# Patient Record
Sex: FEMALE | Race: WHITE
[De-identification: names, ages, dates, MRNs, and addresses within clinical notes are randomized per-mention and may not be internally consistent; named-entity substitution may affect disease eponyms.]

---

## 2018-12-16 ENCOUNTER — HOSPITAL ENCOUNTER (INPATIENT)
Dept: HOSPITAL 80 - FED | Age: 83
LOS: 2 days | Discharge: NURSING, REHAB OR LONG-TERM CARE | DRG: 65 | End: 2018-12-18
Attending: INTERNAL MEDICINE | Admitting: INTERNAL MEDICINE
Payer: COMMERCIAL

## 2018-12-16 DIAGNOSIS — F41.9: ICD-10-CM

## 2018-12-16 DIAGNOSIS — H53.40: ICD-10-CM

## 2018-12-16 DIAGNOSIS — I16.1: ICD-10-CM

## 2018-12-16 DIAGNOSIS — N17.8: ICD-10-CM

## 2018-12-16 DIAGNOSIS — F32.9: ICD-10-CM

## 2018-12-16 DIAGNOSIS — R29.703: ICD-10-CM

## 2018-12-16 DIAGNOSIS — G81.92: ICD-10-CM

## 2018-12-16 DIAGNOSIS — I63.331: Primary | ICD-10-CM

## 2018-12-16 DIAGNOSIS — H40.9: ICD-10-CM

## 2018-12-16 DIAGNOSIS — H54.61: ICD-10-CM

## 2018-12-16 DIAGNOSIS — I11.9: ICD-10-CM

## 2018-12-16 LAB — PLATELET # BLD: 277 10^3/UL (ref 150–400)

## 2018-12-16 NOTE — EDPHY
HPI/HX/ROS/PE/MDM


Narrative: 





CHIEF COMPLAINT:  Left-sided weakness, unsteady





HISTORY OF PRESENT ILLNESS:   The patient is an 82 y/o female arriving with her 

family member for evaluation of left-sided weakness, unsteadiness, and 

difficulty walking since 03:00 this morning, about 17 hours ago. She felt 

normal when she went to sleep about 3 hours prior around midnight. She woke up 

to use the bathroom and "ended up having to crawl and couldn't make it in time.

" She was unable to walk because her left hand and left leg "felt unsteady." 

Throughout the day she continued to have difficulty walking and describes 

numbness and tingling in her left hand. She slept most of the day and when it 

didn't improve she called her daughter who picked her up and brought her to the 

ED for evaluation. Her daughter denies any apparent speech difficulty over the 

phone or in person. No history of hypertension, cardiac disease, respiratory 

disease, or neurologic issues. 





No fever, chills, chest pain, shortness of breath, palpitations, vomiting, 

diarrhea, urinary complaints, headache, neck pain, lightheadedness. 





REVIEW OF SYSTEMS:


Aside from elements discussed in the HPI, a comprehensive 10-point review of 

systems was reviewed and is negative.





PAST MEDICAL HISTORY:   Glaucoma and "major vision problems;" aspirin allergy





SOCIAL HISTORY:   Daughter at bedside. Lives in [place]. Retired. 











VITAL SIGNS: Reviewed by me. /129, .


GENERAL: Well-developed, well-nourished, resting comfortably in no respiratory 

distress.


HEENT: Atraumatic. Eyes: No icterus, no injection. Multiple areas of 

periorbital skin breakdown patient reports is related to an allergic reaction 

to glaucoma eye drops. Mouth: moist mucous membranes.  No erythema or lesions. 

Neck: supple with no adenopathy.


LUNGS: Clear to auscultation bilaterally, no wheezes, rhonchi or rales.


CARDIAC: Tachycardic regular rate and rhythm, no rubs, murmurs or gallops.


ABDOMEN: Soft, nontender, nondistended.


BACK:  No CVA tenderness.


EXTREMITIES: Bruise over right knee and erythema over left knee. No edema.  

Range of motion is normal throughout.


NEURO: Alert and oriented, motor strength appears to be 5/5 throughout.  

Sensory intact to light touch.  Patient's speech is fluid.  She is alert and 

oriented and able to name common objects.  Extraocular movements intact.  

Cranial nerves 2-12 intact


SKIN: Warm and dry, no rash.


PSYCHIATRIC: Normal mentation, no agitation.





Portions of this note were transcribed by a medical scribe.  I personally 

performed a history, physical exam, medical decision making, and confirmed 

accuracy of information the transcribed note.








ED Course: 


This is an 82 y/o female with a history of glaucoma who presents with 

difficulty walking and left-sided weakness and paresthesias upon waking around 

03:00 this morning, 17 hours ago. She last felt normal when she went to bed at 

midnight, 20 hours ago. She has an unsteady gait, but no focal deficits. She is 

hypertensive in the 240/120 range. Presentation concerning for stroke, but she 

is outside the window for TPA. Plan for IV, labs, EKG, head CT. 1L IV NS.





Head CT shows subtle hypodensity and edema right temporal region which could 

represent acute stroke.  Patient will benefit from a brain MRI





Given the patient's consistent and extreme hypertension, systolics greater than 

220 and diastolics greater than 120, cardiac drip was ordered.  15% reduction 

the patient's blood pressure would be 187 over 89. 





The 12 lead EKG was interpreted by myself. Sinus tachycardia, LVH, 

repolarization abnormality. See hard copy and/or "tracemaster" electronic copy 

for interpretation.





2130: Spoke with hospitalist service. Dr. Obregon accepts admission. 





Discussed the emergency department course, need for brief blood control, and 

concerns regarding acute stroke with the family and daughter.  They understand 

reasons to be admitted to the hospital.  Patient continues to complain only of 

a tingling feeling in the left upper extremity and a a weird feeling in the 

left lower extremity.








MDM: 





Differential diagnoses the patient's presenting complaints was considered 

including but not limited to intracranial injury, TIA, ischemic cerebrovascular

  accident, hemorrhagic cerebrovascular accident, hypoglycemia, complex migraine

, metastases, tumor, seizure, or electrolyte abnormality.





- Data Points


Imaging Results: 


 Imaging Impressions





Head CT  12/16/18 20:27


Impression: Possible small area of edema in the region of the anterior limb of 

the right internal capsule. Consider MRI without contrast for further 

evaluation.


 


Results called to Dr. Roma Milton at 9:18 PM.


 


General information for patients regarding this examination can be found at 

[email or web address].


 


If you have questions or comments about this report, please contact me at [phone] (hospital) or [phone] (cell). 











Imaging: Discussed imaging studies w/ On call Radiologist, I viewed and 

interpreted images myself


Laboratory Results: 


 Laboratory Results





 12/16/18 20:16 





 12/16/18 20:16 





 











  12/16/18 12/16/18 12/16/18





  20:35 20:16 20:16


 


WBC      





    


 


RBC      





    


 


Hgb      





    


 


Hct      





    


 


MCV      





    


 


MCH      





    


 


MCHC      





    


 


RDW      





    


 


Plt Count      





    


 


MPV      





    


 


Neut % (Auto)      





    


 


Lymph % (Auto)      





    


 


Mono % (Auto)      





    


 


Eos % (Auto)      





    


 


Baso % (Auto)      





    


 


Nucleat RBC Rel Count      





    


 


Absolute Neuts (auto)      





    


 


Absolute Lymphs (auto)      





    


 


Absolute Monos (auto)      





    


 


Absolute Eos (auto)      





    


 


Absolute Basos (auto)      





    


 


Absolute Nucleated RBC      





    


 


Immature Gran %      





    


 


Immature Gran #      





    


 


Sodium      143 mEq/L mEq/L





     (135-145) 


 


Potassium      4.2 mEq/L mEq/L





     (3.5-5.2) 


 


Chloride      111 mEq/L H mEq/L





     () 


 


Carbon Dioxide      19 mEq/l L mEq/l





     (22-31) 


 


Anion Gap      13 mEq/L mEq/L





     (6-14) 


 


BUN      21 mg/dL mg/dL





     (7-23) 


 


Creatinine      1.2 mg/dL H mg/dL





     (0.6-1.0) 


 


Estimated GFR      43 





    


 


Glucose      120 mg/dL H mg/dL





     () 


 


Calcium      9.6 mg/dL mg/dL





     (8.5-10.4) 


 


POC Troponin I  0.02 ng/mL ng/mL    





   (0.00-0.08)   


 


TSH    Pending   





    














  12/16/18





  20:16


 


WBC  8.74 10^3/uL 10^3/uL





   (3.80-9.50) 


 


RBC  5.09 10^6/uL 10^6/uL





   (4.18-5.33) 


 


Hgb  14.7 g/dL g/dL





   (12.6-16.3) 


 


Hct  44.5 % %





   (38.0-47.0) 


 


MCV  87.4 fL fL





   (81.5-99.8) 


 


MCH  28.9 pg pg





   (27.9-34.1) 


 


MCHC  33.0 g/dL g/dL





   (32.4-36.7) 


 


RDW  13.6 % %





   (11.5-15.2) 


 


Plt Count  277 10^3/uL 10^3/uL





   (150-400) 


 


MPV  10.5 fL fL





   (8.7-11.7) 


 


Neut % (Auto)  79.9 % H %





   (39.3-74.2) 


 


Lymph % (Auto)  12.4 % L %





   (15.0-45.0) 


 


Mono % (Auto)  6.3 % %





   (4.5-13.0) 


 


Eos % (Auto)  0.6 % %





   (0.6-7.6) 


 


Baso % (Auto)  0.6 % %





   (0.3-1.7) 


 


Nucleat RBC Rel Count  0.0 % %





   (0.0-0.2) 


 


Absolute Neuts (auto)  6.99 10^3/uL H 10^3/uL





   (1.70-6.50) 


 


Absolute Lymphs (auto)  1.08 10^3/uL 10^3/uL





   (1.00-3.00) 


 


Absolute Monos (auto)  0.55 10^3/uL 10^3/uL





   (0.30-0.80) 


 


Absolute Eos (auto)  0.05 10^3/uL 10^3/uL





   (0.03-0.40) 


 


Absolute Basos (auto)  0.05 10^3/uL 10^3/uL





   (0.02-0.10) 


 


Absolute Nucleated RBC  0.00 10^3/uL 10^3/uL





   (0-0.01) 


 


Immature Gran %  0.2 % %





   (0.0-1.1) 


 


Immature Gran #  0.02 10^3/uL 10^3/uL





   (0.00-0.10) 


 


Sodium  





  


 


Potassium  





  


 


Chloride  





  


 


Carbon Dioxide  





  


 


Anion Gap  





  


 


BUN  





  


 


Creatinine  





  


 


Estimated GFR  





  


 


Glucose  





  


 


Calcium  





  


 


POC Troponin I  





  


 


TSH  





  











Medications Given: 


 








Discontinued Medications





Nicardipine/Sodium Chloride (Cardene 0.1 Mg/Ml  (Premix))  200 mls @ 0 mls/hr 

IV EDNOW ONE; Titrate


   PRN Reason: Protocol


   Stop: 12/16/18 21:21


   Last Admin: 12/16/18 21:32 Dose:  200 mls


Sodium Chloride (Ns)  1,000 mls @ 0 mls/hr IV ONCE ONE; Wide Open


   PRN Reason: Protocol


   Stop: 12/16/18 21:35


   Last Admin: 12/16/18 21:47 Dose:  1,000 mls





Point of Care Test Results: 


 Chemistry











  12/16/18





  20:35


 


POC Troponin I  0.02 ng/mL ng/mL





   (0.00-0.08) 














General


Time Seen by Provider: 12/16/18 20:29


Initial Vital Signs: 


 Initial Vital Signs











Temperature (C)  37.4 C   12/16/18 20:00


 


Heart Rate  116 H  12/16/18 20:00


 


Respiratory Rate  18   12/16/18 20:00


 


Blood Pressure  233/107 H  12/16/18 20:00


 


O2 Sat (%)  95   12/16/18 20:00








 











O2 Delivery Mode               Room Air














Allergies/Adverse Reactions: 


 





aspirin Allergy (Verified 12/16/18 19:58)


 


glaucoma drops Allergy (Uncoded 12/16/18 19:58)


 








Home Medications: 














 Medication  Instructions  Recorded


 


Bimatoprost 0.01% [Lumigan 0.01% 1 drop EACHEYE HS 12/16/18





(*)]  


 


Dorzolamide/Timolol/Pf [Cosopt Pf 1 drop EACHEYE BID 12/16/18





Eye Drops]  


 


Escitalopram Oxalate [Lexapro] 20 mg PO HS 12/16/18


 


Mirtazapine [Mirtazapine] 15 mg PO HS 12/16/18














Departure





- Departure


Disposition: [facility] Inpatient Acute


Clinical Impression: 


 Numbness and tingling of left arm and leg





CVA (cerebral vascular accident)


Qualifiers:


 CVA mechanism: other Qualified Code(s): I63.89 - Other cerebral infarction





Condition: Good


Report Scribed for: Roma Milton


Report Scribed by: Kell Reynoso


Date of Report: 12/16/18


Time of Report: 21:23

## 2018-12-16 NOTE — CPEKG
Test Reason : OPEN

Blood Pressure : ***/*** mmHG

Vent. Rate : 107 BPM     Atrial Rate : 106 BPM

   P-R Int : 185 ms          QRS Dur : 085 ms

    QT Int : 349 ms       P-R-T Axes : 037 -17 -08 degrees

   QTc Int : 466 ms

 

Sinus tachycardia

Probable left atrial enlargement

LVH with secondary repolarization abnormality

 

Confirmed by Sarah García ([id number]) on 12/16/2018 9:03:12 PM

 

Referred By:             Confirmed By:Sarah García

## 2018-12-17 LAB — PLATELET # BLD: 214 10^3/UL (ref 150–400)

## 2018-12-17 RX ADMIN — MIRTAZAPINE SCH MG: 15 TABLET, ORALLY DISINTEGRATING ORAL at 21:02

## 2018-12-17 RX ADMIN — ATORVASTATIN CALCIUM SCH MG: 40 TABLET, FILM COATED ORAL at 17:51

## 2018-12-17 RX ADMIN — MIRTAZAPINE SCH: 15 TABLET, ORALLY DISINTEGRATING ORAL at 04:55

## 2018-12-17 NOTE — ECHO
[email or web address]





[address] [address] 

Main: [phone] 



Fax: 



Transthoracic Echocardiogram 

Name:             VOLODYMYR SERNA                               MR#:

[id number]

Study Date:       2018                               Study Time:

07:37 AM

YOB: 1935                               Age:

83 year(s)

Height:           154.9 cm (61 in.)                        Weight:

56.7 kg (125 lb.)

BSA:              1.55 m2                                  Gender:

Female

Examination:      Echo                                     Indication:

Ischemic stroke

Image Quality:                                             Contrast: 

Requested by:     Annamaria Toussaint                             BP:

146 mmHg/98 mmHg

Heart Rate:                                                Rhythm: 

Indication:       Ischemic stroke 



Procedure Staff 

Ultrasound Technician:   Kareen Villalobos [facility] 

Reading Physician:       Anup Gardiner MD 

Requesting Provider: 



Conclusions:          Normal size left ventricle.  

Mild concentric LV hypertrophy.  

The ejection fraction is estimated to be 70-75 %.  

Normal diastolic LV function.  

Trivial mitral valve regurgitation.  

Minimal aortic cusp calcification is noted.  

Mild to moderate tricuspid valve regurgitation.  

The pulmonary artery pressure is normal.  

Moderate pulmonic valve regurgitation is noted.  

Consider CECI if there is strong clinical suspicion for cardioembolism.





Measurements: 

Chambers                    Valvular Assessment AV/MV

Valvular Assessment TV/PV



Normal                                   Normal

Normal

Name         Value     Range              Name         Value Range

Name           Value Range

Ao Vane (MM): 3.4 cm    (2.2 cm-3.7            AV Vmax:     1.38 m/s (1

m/s-1.7       TR Vmax:       2.61 mm/s ( - )



cm)                                  m/s)             TR PGmax:

27 mmHg ( - )

IVSd (2D):   0.8 cm (0.6 cm-1.1               AV maxP mmHg ( -

)          syst. PAP: 32 mmHg  ( - )



cm)                AV meanP mmHg ( - )          PV Vmax:

0.88 m/s (0.6 m/s-0.9

LVDd (2D):   4.3 cm    (3.9 cm-5.3            MV E Vmax:   0.65 m/s (

- )                            m/s)



cm)                MV A Vmax:   1.22 m/s ( - )        PV PGmax:      3

mmHg ( - )

LVDs (2D):   2.8 cm    (2.1 cm-4              MV E/A:      0.53 ( - )





cm)   

LVPWd (2D):  0.9 cm    ( - )   

LVEF (BP):   78 %      (>=55 %)   

EF Range:    70-75 % 



Continued Measurements: 

Chambers                    Valvular Assessment AV/MV

Valvular Assessment TV/PV



Patient: VOLODYMYR SERNA                           MRN: [id number]

Study Date: 2018   Page 1 of 2

07:37 AM 









Name                       Value  Name                      Value

Name                      Value

LADs:                    3.4 cm               MV E/E' Septal:

15.60     CVP (est.):             5 mmHg

LADs Lon.0 cm               MV E/E' Lateral:

17.20

LA Area:                 18.1 cm2   

LA Volume:               54 ml   

LA Volume Index:         34.8 ml/m2   



Additional Vessels  



Name                       Value  

Ao Ascending:            3.8 cm    



Findings:             Left Ventricle: 

Normal size left ventricle. Mild concentric LV hypertrophy. Global

hypercontractility of the left

ventricle. The ejection fraction is estimated to be 70-75 %. No

regional wall motion abnormality.

Normal diastolic LV function. E/a wave reversal..  

Right Ventricle: 

Normal size right ventricle.  

Left Atrium: 

The left atirum is borderline dilated. An agitated saline study was

performed and was negative for

intracardiac shunting.  

Right Atrium: 

The right atrium is normal in size.  

Mitral Valve: 

The mitral valve is normal in appearance and function. Trivial mitral

valve regurgitation.

Aortic Valve: 

The aortic valve is tri-leaflet. Minimal aortic cusp calcification is

noted.

Tricuspid Valve: 

The tricuspid valve is normal in appearance and function. Mild to

moderate tricuspid valve

regurgitation. The pulmonary artery pressure is normal.  

Pulmonic Valve: 

The pulmonic valve is normal in appearance and function. Moderate

pulmonic valve regurgitation is

noted.  

Aorta: 

Borderline dilated ascending aorta.. The aorta is normal.  

Pericardium: 

Trivial pericardial effusion. 







Electronically signed by Anup Gardiner MD on 2018 at 10:02 AM 

(No Signature Object) 



Patient: VOLODYMYR SERNA                           MRN: [id number]

Study Date: 2018   Page 2 of 2

07:37 AM 







[id number]

## 2018-12-17 NOTE — GCON
NEUROLOGIC CONSULTATION



REFERRING PHYSICIAN:  Kelly J. Cushing, MD



HISTORY:  The patient is an 83-year-old woman whom I am asked to see in neurologic consultation regar
ding left-sided weakness with onset yesterday around 3 in the morning.  She had gotten up and recogni
zed feeling unsteady to the point that she could not walk safely and was trying to get to the bathroo
m and did not quite make it.  She definitely feels her left side was weak and remains a little bit we
ak compared to her normal baseline.  She is left-handed.  She also feels a little bit of numbness in 
the left arm.  She has never had this occur in the past.  The patient came to the emergency room and 
had a head CT showing possible changes in the right internal capsule of early ischemia but uncertain.
  Carotid ultrasound has been performed without any significant stenoses.  She is not on antiplatelet
 therapy because she has a history of some suspected mild GI bleeding, and also has known eye disease
 with history of bleeding, for which there has been concern about risk of antiplatelet therapy as bes
t I understand.  I have tried to reach her ophthalmologist, but he is not immediately available, and 
left a message. 



She denies any acute headache.  She is not feeling confused.  No chest pain, palpitations, or shortne
ss of breath.  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  History of glaucoma, anxiety, depression.



FAMILY HISTORY:  Unremarkable.  She was never a smoker.  She is retired from working for 40 years as 
a medical technician.  No alcohol or drug use.  She has been  for 30 years and lives in [facility]
or with her family nearby.



MEDICATIONS:  At home, eyedrop, Lumigan, as well as Cosopt, Lexapro, and mirtazapine.



ALLERGIES:  She is not allergic to aspirin, but has risk of bleeding.



REVIEW OF SYSTEMS:  As outlined above, otherwise unremarkable.



PHYSICAL EXAM:  VITAL SIGNS:  She initially had a high blood pressure of 233/107 and received some ni
cardipine, and that was diminished down now to where her current pressure is 154/85, and this was all
 brought down gradually.  Pulse has been in the range of 90.  Respirations 13, temperature 37.1.  GEN
ERAL:  She is well developed, in no acute distress.  EYES:  Clear.  NECK:  Supple.  No bruits or mass
es.  CARDIAC:  Regular rate and rhythm.  No murmur.  NEUROLOGIC:  She is awake, alert and attentive, 
fully oriented to person, place, and time and general situation.  She is nearly blind in the right ey
e, and has diminished vision in the left eye.  Visual field testing is unreliable for great detail.  
Extraocular movements are intact.  Normal facial sensation and strength.  Palate elevates symmetrical
ly.  Tongue protrudes midline.  Hearing is diminished bilaterally.  She is not wearing her hearing ai
ds.  Motor exam:  She has a drift in the left upper extremity and left lower extremity.  Actual muscl
e power is a 4/5 range.  Rapid alternating movements are a little slower in the left hand than the ri
ght.  Sensation is preserved for temperature and light touch.  She can stand by herself from the seat
ed position and is able to maintain her balance with some standby assist.  As she ambulated with the 
physical therapist, she was able to walk, but she has abnormal placement of the left foot and is a li
ttle bit unsteady.  Postural reflexes are partially impaired.  Reflexes are a little more brisk on th
e left than the right and probably a left Babinski sign, but normal on the right.



IMPRESSION:  Total unit time:  70 minutes.  The patient has an NIH Stroke Scale of 2.  She probably h
as experienced a lacunar syndrome, but we will look for any embolic source.  No obvious large vessel 
stenosis in the carotid ultrasound.  Brain MRI and MR angiogram pending, as well as echocardiogram wi
th bubble study.  With regard to antiplatelet therapy, she should be on this, but the history of blee
ding, as well as risk of bleeding in the eye need to be further assessed before making a risk/benefit
 analysis, and we discussed that.  I put in a phone call to Dr. Prince at the [facility]
 to see if I can get his input on the relative risk for bleeding in the eye.  Statin therapy will be 
appropriate to initiate, and she has a lipid panel showing an LDL cholesterol of 183.  Therefore, she
 should be started on statin therapy before leaving the hospital.



Copy requested to:

Dr. Prince 

 Ophthalmology Dept



Job #:  [id number]

## 2018-12-17 NOTE — HOSPPROG
Hospitalist Progress Note


Assessment/Plan: 





CVA - MRI showed acute infarct in right thalamus and right occipital lobe.  MRA 

with thrombosed/occluded right posterior cerebral artery.  Discussed with 

neurology, no indication for transfer at this point.  Searching for embolic 

source.  Carotid artery u/s without flow limiting stenosis.


   -neurology to discuss with opthalmology re: anti-platelet tx given h/o 

opthalmic hemorrhage


   -echo pending 


   -cont telemetry to look for A fib, will need outpt cardiac event monitor if 

no a fib seen here


   -start statin ()


   -permissive hypertension, Labetalol for SBP >220, DBP >120


   -PT/OT, inpt rehab eval requested





Hypertension - initially required cardene drip, which is now off and she is 

normotensive without meds


   -monitor, permissive htn for now


   -goal BP <140/90 in next 24 hrs





Anxiety / Depression - cont lexapro, mirtazapine





Glaucoma - cont outpt eye drops





Limited resuscitation - shock only, no compressions, no intubation


Subjective: Pt feels ok, still has some right sided weakness. No speech 

difficulties.  Eating ok.  No ha or vision changes.


Objective: 


 Vital Signs











Temp Pulse Resp BP Pulse Ox


 


 37.1 C   91   13   154/85 H  97 


 


 12/17/18 08:00  12/17/18 08:00  12/17/18 08:00  12/17/18 08:00  12/17/18 08:00








 Laboratory Results





 12/17/18 04:30 





 12/17/18 04:30 





 











 12/16/18 12/17/18 12/18/18





 05:59 05:59 05:59


 


Intake Total  330 


 


Output Total  350 


 


Balance  -20 














- Physical Exam


Constitutional: no apparent distress


Eyes: PERRL


Ears, Nose, Mouth, Throat: moist mucous membranes


Cardiovascular: regular rate and rhythym


Respiratory: no respiratory distress, clear to auscultation


Gastrointestinal: normoactive bowel sounds, soft, non-tender abdomen


Skin: warm


Musculoskeletal: other (+RUE and RLE weakness)


Neurologic: AAOx3


Psychiatric: interacting appropriately





ICD10 Worksheet


Patient Problems: 


 Problems











Problem Status Onset


 


CVA (cerebral vascular accident) Acute  


 


Numbness and tingling of left arm and leg Acute

## 2018-12-17 NOTE — ASMTCMCOM
CM Note

 

CM Note                       

Notes:

82yo female admitted for CVA-L sided weakness, HTN ER, NAVID, Tachy. She has a Hx of Glaucoma, Vision 


loss, Anxiety, Depression. She has been living independently at home. Her daughter, Mamta is her 

MPOA, Code status is Limited Resuscitation. Therapies to eval for discharge needs. CM to follow.

 

Date Signed:  12/17/2018 09:59 AM

Electronically Signed By:Nora Morris LCSW

## 2018-12-17 NOTE — PDMN
Medical Necessity


Medical necessity: Pt meets IP criteria as of 12/16/2018 per MD and MCG MG-N (

Neurology GRG); est los > 2 mn for ongoing tx and management of acute CVA with 

L sided weakness as well as hypertensive emergency, sinus tachycardia, and NAVID; 

requiring further workup, neurology consultation, cardene gtt, ICU level care, 

and therapies.

## 2018-12-18 ENCOUNTER — HOSPITAL ENCOUNTER (INPATIENT)
Dept: HOSPITAL 80 - BREH | Age: 83
LOS: 15 days | Discharge: HOME HEALTH | DRG: 57 | End: 2019-01-02
Attending: INTERNAL MEDICINE | Admitting: INTERNAL MEDICINE
Payer: COMMERCIAL

## 2018-12-18 VITALS — SYSTOLIC BLOOD PRESSURE: 148 MMHG | DIASTOLIC BLOOD PRESSURE: 95 MMHG

## 2018-12-18 DIAGNOSIS — H53.462: ICD-10-CM

## 2018-12-18 DIAGNOSIS — G31.84: ICD-10-CM

## 2018-12-18 DIAGNOSIS — I69.364: Primary | ICD-10-CM

## 2018-12-18 DIAGNOSIS — I12.9: ICD-10-CM

## 2018-12-18 DIAGNOSIS — R32: ICD-10-CM

## 2018-12-18 DIAGNOSIS — H40.9: ICD-10-CM

## 2018-12-18 DIAGNOSIS — I69.398: ICD-10-CM

## 2018-12-18 DIAGNOSIS — F41.8: ICD-10-CM

## 2018-12-18 DIAGNOSIS — H54.61: ICD-10-CM

## 2018-12-18 DIAGNOSIS — N18.3: ICD-10-CM

## 2018-12-18 PROCEDURE — F08Z7ZZ VOCATIONAL ACTIVITIES AND FUNCTIONAL COMMUNITY OR WORK REINTEGRATION SKILLS TREATMENT: ICD-10-PCS | Performed by: INTERNAL MEDICINE

## 2018-12-18 PROCEDURE — F07M3ZZ MOTOR FUNCTION TREATMENT OF MUSCULOSKELETAL SYSTEM - WHOLE BODY: ICD-10-PCS | Performed by: INTERNAL MEDICINE

## 2018-12-18 PROCEDURE — F0636ZZ COMMUNICATIVE/COGNITIVE INTEGRATION SKILLS TREATMENT OF NEUROLOGICAL SYSTEM - WHOLE BODY: ICD-10-PCS | Performed by: INTERNAL MEDICINE

## 2018-12-18 RX ADMIN — ATORVASTATIN CALCIUM SCH MG: 40 TABLET, FILM COATED ORAL at 09:42

## 2018-12-18 RX ADMIN — MIRTAZAPINE SCH MG: 15 TABLET, ORALLY DISINTEGRATING ORAL at 19:59

## 2018-12-18 RX ADMIN — BIMATOPROST SCH DROPS: 0.1 SOLUTION/ DROPS OPHTHALMIC at 19:58

## 2018-12-18 NOTE — ASDISCHSUM
----------------------------------------------

Discharge Information

----------------------------------------------

Plan Status:Inpatient Rehab                          Medically Cleared to Leave:12/17/2018

Discharge Date:12/17/2018                            CM D/C Disposition:[facility]

ADT D/C Disposition:[facility] IP                Projected Discharge Date:12/18/2018 03:00 PM

Transportation at D/C:Wheelchair Van                 Discharge Delay Reason:

Follow-Up Date:12/18/2018 03:00 PM                   Discharge Slot:2 - 12:01 pm - 18:00 pm

Final Diagnosis:CVA-L sided weakness, HTN ER, NAVID, Tachy

----------------------------------------------

Placement Information

----------------------------------------------

Referral Type:Rehabilitation Hospital                Referral ID:[id number]

Provider Name:[facility] Inpatient [facility]hab

Address 1:[address]                            Phone Number:

Address 2:                                           Fax Number:

[facility]:[place]                                         Selection Factors:

State:[place]

 

----------------------------------------------

Patient Contact Information

----------------------------------------------

Contact Name:DONALD                          Relationship:Daughter

Address:                                             Home [phone]

                                                     Work Phone:

City:                                                [facility] Phone:

[facility] Code:                                      Email:

----------------------------------------------

Financial Information

----------------------------------------------

Financial Class:Medicare

Primary Plan Desc:MEDICARE INPATIENT                 Primary Plan Number:[id number]

Secondary Plan Desc:NROA [place]                 Secondary Plan Number:[id number]

 

 

----------------------------------------------

Assessment Information

----------------------------------------------

----------------------------------------------

LACE

----------------------------------------------

LACE

 

Length of stay for            Answers:  2 days                                

current admission                                                             

Acuity / Level of             Answers:  Yes                                   

Care: Did the patient                                                         

have an inpatient                                                             

admission?                                                                    

Comorbidities - select        Answers:  Cerebrovascular disease               

all that apply                          (CVA, TIA, aneurysms, vasc            

                                        ular dementia)                        

                                        Other                         Notes:  Visual deficits; Glauco
ma

# of Emergency department     Answers:  1-2                                   

visits in the last 6                                                          

months                                                                        

Social determinants           Answers:  Mental health diagnosis               

                                        (anxiety, depression, pers            

                                        onality disorders, etc.)              

Score: 11

 

Date Signed:  12/18/2018 02:12 PM

Electronically Signed By:Nora Morris LCSW

 

 

----------------------------------------------

[facility] CARLOS Progress Note

----------------------------------------------

CM Note

 

CM Note                       

Notes:

84yo female admitted for CVA-L sided weakness, HTN ER, NAVID, Tachy. She has a Hx of Glaucoma, Vision 


loss, Anxiety, Depression. She has been living independently at home. Her daughter, Mamta is her 

MPOA, Code status is Limited Resuscitation. Therapies to eval for discharge needs. CM to follow.

 

Date Signed:  12/17/2018 09:59 AM

Electronically Signed By:Nora Morris LCSW

 

 

----------------------------------------------

[facility] CM Progress Note

----------------------------------------------

CM Note

 

CM Note                       

Notes:

Met with patient's daughter, Mamta in a 'Family Meeting" 12/17/18. Mamta reports that her 

mother moved her from [place]. She is living at [facility] in [place]. Patient is [place] and 

has poor eyesight since she was 32.  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 Has Anxiety and Depression and has been in In-pt Psych for her depression 3x. Patient had 

been seeing a Geriatric psychiatrist and then a psychologist until they recently agreed she no 

longer needed that assist. Daughter concerned that her mother is an introvert, poor sight and 

hearing tends to isolate herself which adds to the depression. Patient enjoys crossword puzzles and 


her IPad. She has friends and a Bro-in-law in [place] she stays in touch with her IPad and two 

grandchildren 15 + 14. We talked about In-pt Rehab on discharge. [facility] In-pt Rehab doesn't have beds 

available today, but will Wednesday. 

Daughter interested in [facility] In-pt Rehab.

 

Date Signed:  12/18/2018 12:03 PM

Electronically Signed By:Nora Morris LCSW

 

 

----------------------------------------------

Case Management Discharge Plan Note

----------------------------------------------

Case Management Discharge

 

Discharge Order Complete?     Answers:  Yes                                   

Patient to Obtain             Answers:  Other                         Notes:  In-pt Rehab

Medications                                                                   

Transportation Arranged       Answers:  Family/Friends                        

Transport will Pick (Date     12/18/2018 02:30 PM

& Time)                       

Faxed Final Orders            Answers:  Yes                           Notes:  In-pt Rehab

Family Notified               Answers:  Yes                           Notes:  Dtr to transport

Discharge Comments            

Notes:

Patient has been discharged to In-pt Rehab. Dtr to transport

 

Date Signed:  12/18/2018 02:14 PM

Electronically Signed By:Nora Morris LCSW

 

 

----------------------------------------------

Intervention Information

----------------------------------------------

## 2018-12-18 NOTE — ASMTLACE
LACE

 

Length of stay for            Answers:  2 days                                

current admission                                                             

Acuity / Level of             Answers:  Yes                                   

Care: Did the patient                                                         

have an inpatient                                                             

admission?                                                                    

Comorbidities - select        Answers:  Cerebrovascular disease               

all that apply                          (CVA, TIA, aneurysms, vasc            

                                        ular dementia)                        

                                        Other                         Notes:  Visual deficits; Glauco
ma

# of Emergency department     Answers:  1-2                                   

visits in the last 6                                                          

months                                                                        

Social determinants           Answers:  Mental health diagnosis               

                                        (anxiety, depression, pers            

                                        onality disorders, etc.)              

Score: 11

 

Date Signed:  12/18/2018 02:12 PM

Electronically Signed By:Nora Morris LCSW

## 2018-12-18 NOTE — ASMTDCNOTE
Case Management Discharge

 

Discharge Order Complete?     Answers:  Yes                                   

Patient to Obtain             Answers:  Other                         Notes:  In-pt Rehab

Medications                                                                   

Transportation Arranged       Answers:  Family/Friends                        

Transport will Pick (Date     12/18/2018 02:30 PM

& Time)                       

Faxed Final Orders            Answers:  Yes                           Notes:  In-pt Rehab

Family Notified               Answers:  Yes                           Notes:  Dtr to transport

Discharge Comments            

Notes:

Patient has been discharged to In-pt Rehab. Dtr to transport

 

Date Signed:  12/18/2018 02:14 PM

Electronically Signed By:Nora Morris LCSW
oriented to person, place, time and situation

## 2018-12-18 NOTE — HOSPPROG
Hospitalist Progress Note


Assessment/Plan: 





#Acute CVA: in PCA territory


-ASA, statin. Inpatient rehab consult





#HHTN





#Glaucoma





#Anxiety/depression














See DC summary for A&P


Objective: 


 Vital Signs











Temp Pulse Resp BP Pulse Ox


 


 36.9 C   80   16   148/95 H  91 L


 


 12/18/18 07:39  12/18/18 07:39  12/18/18 07:39  12/18/18 07:39  12/18/18 07:39








 Laboratory Results





 12/17/18 04:30 





 12/17/18 04:30 





 











 12/17/18 12/18/18 12/19/18





 05:59 05:59 05:59


 


Intake Total 330 650 


 


Output Total 350  


 


Balance -20 650 














ICD10 Worksheet


Patient Problems: 


 Problems











Problem Status Onset


 


CVA (cerebral vascular accident) Acute  


 


Numbness and tingling of left arm and leg Acute

## 2018-12-18 NOTE — PDIAF
- Diagnosis


Diagnosis: CVA


Code Status: Limited Resuscitation





- Medication Management


Discharge Medications: electronically signed and located in the Home Medication 

List.





- Orders


Services needed: Registered Nurse, Certified Nursing Aide, Physical Therapy, 

Occupational Therapy, Speech Language Pathologist


Diet Recommendation: cardiac -low fat low salt


Diet Texture: Regular Texture Diet


Additional Instructions: 


Monitor blood pressure. May need addition of Norvasc 2.5mg and titration 

starting 12/19/18





- Follow Up Care


Current Providers and Referrals: 


Ang Smipson MD [Primary Care Provider] - As per Instructions

## 2018-12-18 NOTE — GDS
DISCHARGE DIAGNOSES:  

1.  Acute stroke in the right posterior cerebral artery territory.  

2.  Hypertension.

3.  Anxiety/depression.

4.  Glaucoma.

5.  Right eye blindness.



HISTORY OF PRESENT ILLNESS:  A pleasant 83-year-old female with glaucoma, vision loss, anxiety/depres
mateo who presented to the ER with complaints of left-sided weakness starting 17 hours prior to visit.
  She woke at 3 a.m. on 12/16, tried to go to the bathroom, and was weak on the left side and stumble
d.  She was too weak to walk, so tried to pull herself up by crawling on the floor.  She denied loss 
of consciousness.  Denied any prodromal chest pain, shortness of breath, or dizziness.  She went back
 to sleep, but experienced numbness and tingling in her left hand when she awoke.



HOSPITAL COURSE BY PROBLEM:  

1.  Acute infarct, right occipital lobe/acute lacunar infarct of the right thalamus.  The patient was
 evaluated by Neurology.  No evidence of atrial fibrillation.  Echocardiogram negative for intracardi
ac shunting.  She was started on aspirin and statin.  There was concern of bleeding into her eye with
 her significant vision issues.  Dr. Shaw spoke with her primary ophthalmologist, simon Gandhi
ho agreed that aspirin is necessary given acute stroke.  Will monitor blood pressure.  Will not start
 antihypertensive at this time.  The patient was evaluated by PT, OT, and had speech evaluation here.
  She will be transferred to inpatient rehab to continue therapies.

2.  Glaucoma.  Continue eyedrops.  Follow up with her ophthalmologist.

3.  Anxiety/depression.  Continue home medications.

4.  Hypertension.  Initially required Cardene drip.  Normotensive now.  May need the addition of a lo
w-dose antihypertensive, such as Norvasc, in the next several days if it remains greater than 150s to
 160s.



GOALS:  Limited resuscitation shock only.  No compressions or intubation.



DISPOSITION:  The patient is stable for discharge to inpatient rehab.



PHYSICAL EXAMINATION:  VITAL SIGNS:  Today, temperature 36.9, blood pressure is 148/95, heart rate is
 in the 80s, respiration rate is 16, 91% on room air.  GENERAL:  Sitting up in the chair.  No acute d
istress.  HEENT:  Right eye blindness.  CV:  Regular rate and rhythm.  LUNGS:  Clear.  ABDOMEN:  Soft
, nontender, and nondistended with positive bowel sounds.  :  No Naik.  MUSCULOSKELETAL:  Moving a
ll 4 extremities.  NEURO:  2 through 12 intact.  PSYCH:  Alert and oriented x3.  Very pleasant. 



Time spent on discharge greater than 35 minutes, explaining plan to the patient and daughter and disc
ussing case with Dr. Shaw.





Job #:  [id number]

## 2018-12-18 NOTE — ASMTCMCOM
CM Note

 

CM Note                       

Notes:

Met with patient's daughter, Mamta in a 'Family Meeting" 12/17/18. Mamta reports that her 

mother moved her from [place]. She is living at [facility] in [place]. Patient is [place] and 

has poor eyesight since she was 32.  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 Has Anxiety and Depression and has been in In-pt Psych for her depression 3x. Patient had 

been seeing a Geriatric psychiatrist and then a psychologist until they recently agreed she no 

longer needed that assist. Daughter concerned that her mother is an introvert, poor sight and 

hearing tends to isolate herself which adds to the depression. Patient enjoys crossword puzzles and 


her IPad. She has friends and a Bro-in-law in [place] she stays in touch with her IPad and two 

grandchildren 15 + 14. We talked about In-pt Rehab on discharge. [facility] In-pt Rehab doesn't have beds 

available today, but will Wednesday. 

Daughter interested in [facility] In-pt Rehab.

 

Date Signed:  12/18/2018 12:03 PM

Electronically Signed By:Nora Morris LCSW

## 2018-12-18 NOTE — NEUROPROG
Assessment: 


25 min total unit time.  The patient has an acute stroke in the right posterior 

cerebral artery territory of uncertain cause.  I had a discussion with her 

ophthalmologist at the [facility].  He and I both feel that the 

risk of stroke is greater than the risk of significant bleeding in the eye with 

use of anti-platelet therapy, so I will initiate baby aspirin and obtain her 

permission to do this.  I will also start statin therapy for secondary stroke 

prophylaxis.  Hopefully she will qualify for inpatient rehab care and 

consultation has been placed.


Subjective: 


The patient is reporting that she is feeling a little bit better but no major 

changes in her symptoms of mild left-sided weakness.


Objective: 





 Vital Signs











Temp Pulse Resp BP Pulse Ox


 


 36.9 C   80   16   148/95 H  91 L


 


 12/18/18 07:39  12/18/18 07:39  12/18/18 07:39  12/18/18 07:39  12/18/18 07:39








 Laboratory Results





 12/17/18 04:30 





 12/17/18 04:30 





 











 12/17/18 12/18/18 12/19/18





 05:59 05:59 05:59


 


Intake Total 330 650 


 


Output Total 350  


 


Balance -20 650 








On examination, I believe she has a left visual field deficit on top of the 

near blindness in the right eye.  There remains a mild left sided weakness.  

With the visual field deficit, her NIH stroke scale would be 3. MRI scan showed 

evidence of an occluded right posterior cerebral artery with corresponding 

acute ischemic changes in that distribution with a thalamic infarct and right 

occipital infarction.  


Allergies/Adverse Reactions: 


 





glaucoma drops Allergy (Uncoded 12/16/18 19:58)

## 2018-12-18 NOTE — PDIAF
- Diagnosis


Diagnosis: CVA


Code Status: Limited Resuscitation





- Medication Management


Discharge Medications: electronically signed and located in the Home Medication 

List.





- Orders


Services needed: Registered Nurse, Certified Nursing Aide, Physical Therapy, 

Occupational Therapy, Speech Language Pathologist


Diet Recommendation: cardiac -low fat low salt


Diet Texture: Regular Texture Diet


Additional Instructions: 


Monitor blood pressure. 





- Follow Up Care


Current Providers and Referrals: 


Ang Simpson MD [Primary Care Provider] - As per Instructions

## 2018-12-19 RX ADMIN — MIRTAZAPINE SCH MG: 15 TABLET, ORALLY DISINTEGRATING ORAL at 20:04

## 2018-12-19 RX ADMIN — ATORVASTATIN CALCIUM SCH MG: 40 TABLET, FILM COATED ORAL at 08:45

## 2018-12-19 RX ADMIN — ASPIRIN SCH MG: 81 TABLET, DELAYED RELEASE ORAL at 08:46

## 2018-12-19 RX ADMIN — BIMATOPROST SCH DROPS: 0.1 SOLUTION/ DROPS OPHTHALMIC at 20:04

## 2018-12-19 NOTE — SOAPPROG
SOAP Progress Note


Assessment/Plan: 


Assessment:





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 

sensory abnormalities on the left side of her body and left sided visual 

impairment in a woman with preexisting right-sided visual impairment, as well 

as cataracts.  


* PT and OT to optimize mobility and activities of daily living.  Specific 

vision testing per Occupational therapy.





Memory loss and word-finding deficits to be assessed and treated per Speech and 

Language Pathology.





Secondary prevention of cerebrovascular accident with aspirin, blood pressure 

control, and atorvastatin for dyslipidemia.





Hypertension.  She reports a history of white coat hypertension.  Her blood 

pressure has been most often well above target of 140/90 during her hospital 

stay.  Will initiate amlodipine at 2.5 mg daily starting 12/19/2018.  Will 

monitor her blood pressure and adjust or add medications as needed.





Question of bleeding gums.  Only anticoagulant/anti-platelet agent is 81 mg of 

aspirin.  She did not receive any other anticoagulants during her hospital 

stay.  She denies history of dental problems.  If it continues will check CBC 

and coagulation profile.





Glaucoma.  Continue her eyedrops.





Anxiety and depression.  Continue citalopram and mirtazapine.





DVT prophylaxis.  She has good mobility and no hemiplegia.  Will not initiate 

pharmacologic anticoagulation, especially in light of history of retinal 

hemorrhage.





FOLLOW-UP.  Primary care provider is Dr. Ang Simpson.  She will have follow up 

with her ophthalmologist at the [facility] in [place] is Dr. Prince.  She may also benefit from a neuroophthalmology consultation.








12/19/18 12:10





Subjective: 





Has numbness and tingling of left foot.  Does not interfere with sleep and is 

not very bothersome.  Reports that therapy has concerns that walker is not 

helpful had increases collisions with objects.  Otherwise without complaints.  

Not in pain.  Sleeping well.





She reports that the OT noted in pain contains when she has put out her 

toothpaste after brushing her teeth.  She reports she did not taste blood.


Objective: 





 Vital Signs











Temp Pulse Resp BP Pulse Ox


 


 37.1 C   74   16   137/83 H  93 


 


 12/19/18 06:35  12/19/18 06:35  12/19/18 06:35  12/19/18 08:45  12/19/18 06:35








 











 12/18/18 12/19/18 12/20/18





 05:59 05:59 05:59


 


Intake Total  550 200


 


Output Total  450 200


 


Balance  100 0














Physical Exam





- Physical Exam


General Appearance: WD/WN, alert, no apparent distress


Respiratory: No respiratory distress, No accessory muscle use


Skin: normal color, warm/dry


Neuro/Psych: alert, normal mood/affect, oriented x 3





ICD10 Worksheet


Patient Problems: 


 Problems











Problem Status Onset


 


CVA (cerebral vascular accident) Acute  


 


Numbness and tingling of left arm and leg Acute

## 2018-12-19 NOTE — PDOREHIP
Admission IRF-[facility]





- Admission - 3 Day Assessment Period


Admission Date/Day 1: 12/18/18


Day 2: 12/19/18


Day 3: 12/20/18





- Active Diagnoses


Comorbidities and Co-existing Conditions at Admission: 62123. None of the Above





- Skin Conditions


Unhealed Pressure Ulcer (1 or more/Stage 1 or >)-Admission: 0. No


# Stage 1 Pressure Ulcers-Admission: 0


# Stage 2 Pressure Ulcers-Admission: 0


# Stage 3 Pressure Ulcers-Admission: 0


# Stage 4 Pressure Ulcers-Admission: 0


# Unstageable Pressure Ulcers (Non-remove Dress)-Admission: 0


# Unstageable Pressure Ulcers (Slough/Eschar)-Admission: 0


# Unstageable Pressure Ulcers (Deep Tissue Injury)-Admission: 0

## 2018-12-20 RX ADMIN — ASPIRIN SCH MG: 81 TABLET, DELAYED RELEASE ORAL at 08:36

## 2018-12-20 RX ADMIN — BIMATOPROST SCH DROP: 0.1 SOLUTION/ DROPS OPHTHALMIC at 20:20

## 2018-12-20 RX ADMIN — MIRTAZAPINE SCH MG: 15 TABLET, ORALLY DISINTEGRATING ORAL at 20:16

## 2018-12-20 RX ADMIN — ATORVASTATIN CALCIUM SCH MG: 40 TABLET, FILM COATED ORAL at 08:35

## 2018-12-20 NOTE — SOAPPROG
SOAP Progress Note


Assessment/Plan: 


Assessment:





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 

sensory abnormalities on the left side of her body and left sided visual 

impairment in a woman with preexisting right-sided visual impairment, as well 

as cataracts.  


* Per OT, visual issues may include hemianopsia and hemineglect.  New shoes are 

significantly more severe than pre-existing vision loss in the right eye.


*   PT and OT to optimize mobility and activities of daily living.  Specific 

vision testing per Occupational therapy.





Memory loss and word-finding deficits to be assessed and treated per Speech and 

Language Pathology.





Secondary prevention of cerebrovascular accident with aspirin, blood pressure 

control, and atorvastatin for dyslipidemia.





Hypertension.  She reports a history of white coat hypertension.  Her blood 

pressure has been most often well above target of 140/90 during her hospital 

stay.  Started amlodipine at 2.5 mg daily starting 12/19/2018.  Increase to 5 

mg QD on 12/20/2018.  Will monitor her blood pressure and adjust or add 

medications as needed.





Question of bleeding gums.  Only anticoagulant/anti-platelet agent is 81 mg of 

aspirin.  She did not receive any other anticoagulants during her hospital 

stay.  She denies history of dental problems.  If it continues will check CBC 

and coagulation profile.





Glaucoma.  Continue her eyedrops.





Anxiety and depression.  Continue citalopram and mirtazapine.





DVT prophylaxis.  She has good mobility and no hemiplegia.  Will not initiate 

pharmacologic anticoagulation, especially in light of history of retinal 

hemorrhage.





DISPOSITION:  With severe visual deficits it is unclear whether she will be 

able to return to independent living.  Might consider [facility]. Await improvement 

with therapies.





FOLLOW-UP.  Primary care provider is Dr. Ang Simpson.  She will have follow up 

with her ophthalmologist at the [facility] in [place] is Dr. Prince.  She may also benefit from a neuroophthalmology consultation.








12/20/18 13:30





Subjective: 





Notes some memory loss in attempting to recall of her medications with SLP.  

Sleeping well.  Not in pain.  No fevers or chills, no cough or dyspnea.


Objective: 





 Vital Signs











Temp Pulse Resp BP Pulse Ox


 


 36.7 C   77   18   135/86 H  93 


 


 12/20/18 07:30  12/20/18 07:30  12/20/18 07:30  12/20/18 10:05  12/20/18 07:30








 











 12/19/18 12/20/18 12/21/18





 05:59 05:59 05:59


 


Intake Total 550 730 


 


Output Total 450 550 


 


Balance 100 180 














Physical Exam





- Physical Exam


General Appearance: WD/WN, alert, no apparent distress


Respiratory: No respiratory distress, No accessory muscle use


Skin: normal color, warm/dry


Neuro/Psych: alert, normal mood/affect, oriented x 3, abnormal gait (Slow and 

slightly wide based, with front wheeled walker.  Needs contact guard assist for 

steering to avoid colliding with objects on her left.)





ICD10 Worksheet


Patient Problems: 


 Problems











Problem Status Onset


 


CVA (cerebral vascular accident) Acute  


 


Numbness and tingling of left arm and leg Acute

## 2018-12-21 RX ADMIN — MIRTAZAPINE SCH MG: 15 TABLET, ORALLY DISINTEGRATING ORAL at 21:28

## 2018-12-21 RX ADMIN — BIMATOPROST SCH DROP: 0.1 SOLUTION/ DROPS OPHTHALMIC at 21:28

## 2018-12-21 RX ADMIN — ATORVASTATIN CALCIUM SCH MG: 40 TABLET, FILM COATED ORAL at 08:47

## 2018-12-21 RX ADMIN — ASPIRIN SCH MG: 81 TABLET, DELAYED RELEASE ORAL at 08:47

## 2018-12-21 NOTE — SOAPPROG
SOAP Progress Note


Assessment/Plan: 


Assessment:





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 

sensory abnormalities on the left side of her body and left sided visual 

impairment in a woman with preexisting right-sided visual impairment, as well 

as cataracts.  


* Initial functional independence measure is 79 as of 12/21/2018.  Therapies 

node left visual field loss verses 8 normal.  Trialing walker versus cane.  She 

often collided with objects on the left using the walker.  She has ambulated 

greater than 3rd feet with contact guard assist for steering.  She requires 

structure in cues for activities of daily living.  She can mostly dress herself 

but requires assistance for fine motor tasks including fastening her bra.


* Continue  PT and OT to optimize mobility and activities of daily living.  

Specific vision testing per Occupational therapy.





Memory loss and word-finding deficits


* Scored 23/30 on the [employer] mental status exam.  Has mild to 

moderate decrease attention memory and word retrieval.


* Continue Speech and Language Pathology.





Secondary prevention of cerebrovascular accident with aspirin, blood pressure 

control, and atorvastatin for dyslipidemia.





Hypertension.  She reports a history of white coat hypertension.  Her blood 

pressure has been most often well above target of 140/90 during her hospital 

stay.  


* Started amlodipine at 2.5 mg daily starting 12/19/2018.  Increased to 5 mg QD 

on 12/20/2018.  


* Adequate control, 12/21/2018.





Urinary incontinence at night.  May be due to visual challenges delaying 

ambulation to bathroom, however it depresses her functional independence 

measure by 6 points.  Initiate bedside commode, 12/21/2018.





Question of bleeding gums.  Only anticoagulant/anti-platelet agent is 81 mg of 

aspirin.  She did not receive any other anticoagulants during her hospital 

stay.  She denies history of dental problems.  If it continues will check CBC 

and coagulation profile.





Glaucoma.  Continue her eyedrops.





Anxiety and depression.  Continue citalopram and mirtazapine.





DVT prophylaxis.  She has good mobility and no hemiplegia.  Will not initiate 

pharmacologic anticoagulation, especially in light of history of retinal 

hemorrhage.





DISPOSITION:  Attended staffing, 15 min.  Discussed with case management, 

dietitian, nursing, PT, OT, SLP.  Unclear whether she will be able to achieve 

sufficient independence to return to independent living facility; might need 

increased services.  Planning for home visit to assess function in a familiar 

environment and for family conference.  Tentative discharge date set for 1/2/

2018.





FOLLOW-UP.  Primary care provider is Dr. Ang Simpson.  She will have follow up 

with her ophthalmologist at the [facility] in [place] is Dr. Prince.  She may also benefit from a neuroophthalmology consultation.





12/21/18 12:21





Subjective: 





No complaints.  Not in pain.  Slept well; she thinks maybe too well as she had 

episode of urinary incontinence overnight.  After awakening and calling nurse 

it took her a very long time due to visual deficit to get to the bathroom and 

had incontinence on the way.


Objective: 





 Vital Signs











Temp Pulse Resp BP Pulse Ox


 


 37.1 C   70   16   132/84 H  94 


 


 12/21/18 07:41  12/21/18 07:41  12/21/18 07:41  12/21/18 08:47  12/21/18 07:41








 











 12/20/18 12/21/18 12/22/18





 05:59 05:59 05:59


 


Intake Total 730 1480 


 


Output Total 550  


 


Balance 180 1480 














- Time Spent With Patient


Time Spent With Patient: 





Greater than 35 min floor time today, including more than 50% of time in 

coordination of care during staffing meeting, and counseling patient.





Physical Exam





- Physical Exam


General Appearance: WD/WN, alert, no apparent distress


Respiratory: No respiratory distress, No accessory muscle use


Skin: normal color, warm/dry


Neuro/Psych: alert, normal mood/affect, oriented x 3, other (Very low vision, 

holding iPad very close to her face while setting up voice recognition with 

speech therapist.)





ICD10 Worksheet


Patient Problems: 


 Problems











Problem Status Onset


 


CVA (cerebral vascular accident) Acute  


 


Numbness and tingling of left arm and leg Acute

## 2018-12-22 RX ADMIN — ASPIRIN SCH MG: 81 TABLET, DELAYED RELEASE ORAL at 09:12

## 2018-12-22 RX ADMIN — BIMATOPROST SCH DROP: 0.1 SOLUTION/ DROPS OPHTHALMIC at 20:48

## 2018-12-22 RX ADMIN — MIRTAZAPINE SCH MG: 15 TABLET, ORALLY DISINTEGRATING ORAL at 20:49

## 2018-12-22 RX ADMIN — ATORVASTATIN CALCIUM SCH MG: 40 TABLET, FILM COATED ORAL at 09:13

## 2018-12-22 NOTE — SOAPPROG
SOAP Progress Note


Assessment/Plan: 


Assessment:





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 

sensory abnormalities on the left side of her body and left sided visual 

impairment in a woman with preexisting right-sided visual impairment, as well 

as cataracts.  


* Initial functional independence measure is 79 as of 12/21/2018.  Therapies 

node left visual field loss versus ignoral.  Trialing walker versus cane.  She 

often collides with objects on the left using the walker.  She has ambulated 

greater than 150 feet with contact guard assist for steering.  She requires 

structure and cues for activities of daily living.  She can mostly dress 

herself but requires assistance for fine motor tasks including fastening her 

bra.


* Continue  PT and OT to optimize mobility and activities of daily living.  

Specific vision testing per Occupational therapy.





Memory loss and word-finding deficits


* Scored 23/30 on the [employer] mental status exam.  Has mild to 

moderate decrease attention memory and word retrieval.


* Continue Speech and Language Pathology.





Secondary prevention of cerebrovascular accident with aspirin, blood pressure 

control, and atorvastatin for dyslipidemia.





Hypertension.  She reports a history of white coat hypertension.  Her blood 

pressure has been most often well above target of 140/90 during her hospital 

stay.  


* Started amlodipine at 2.5 mg daily starting 12/19/2018.  Increased to 5 mg QD 

on 12/20/2018.  


* Adequate control, 12/21/2018.





Urinary incontinence at night.  May be due to visual challenges delaying 

ambulation to bathroom, however it depresses her functional independence 

measure by 6 points.  Initiate bedside commode, 12/21/2018.





Question of bleeding gums.  Only anticoagulant/anti-platelet agent is 81 mg of 

aspirin.  She did not receive any other anticoagulants during her hospital 

stay.  She denies history of dental problems.  If it continues will check CBC 

and coagulation profile.





Glaucoma.  Continue her eyedrops.





Anxiety and depression.  Continue citalopram and mirtazapine.





DVT prophylaxis.  She has good mobility and no hemiplegia.  Will not initiate 

pharmacologic anticoagulation, especially in light of history of retinal 

hemorrhage.





DISPOSITION:  Unclear whether she will be able to achieve sufficient 

independence to return to independent living facility; might need increased 

services.  Planning for home visit to assess function in a familiar environment 

and for family conference.  Tentative discharge date set for 1/2/2018.





FOLLOW-UP.  Primary care provider is Dr. Ang Simpson.  She will have follow up 

with her ophthalmologist at the [facility] ([place], [place]) in [place] is Dr. Prince.  She may also benefit from a neuroophthalmology consultation.





12/22/18 13:55





Subjective: 





No complaints.  Sleeping well.  Not in pain.  Aware of functional limitations 

due to low vision.


Objective: 





 Vital Signs











Temp Pulse Resp BP Pulse Ox


 


 36.8 C   76   18   138/84 H  95 


 


 12/22/18 09:00  12/22/18 09:00  12/22/18 09:00  12/22/18 09:13  12/22/18 09:00








 











 12/21/18 12/22/18 12/23/18





 05:59 05:59 05:59


 


Intake Total 1480 1800 


 


Output Total  1150 


 


Balance 1480 650 














Physical Exam





- Physical Exam


General Appearance: WD/WN, alert, no apparent distress


Respiratory: No respiratory distress, No accessory muscle use


Skin: normal color, warm/dry


Neuro/Psych: alert, normal mood/affect, oriented x 3, sensory deficit (Able to 

find mute button on television remote by holding it very close to her left eye.)





ICD10 Worksheet


Patient Problems: 


 Problems











Problem Status Onset


 


CVA (cerebral vascular accident) Acute  


 


Numbness and tingling of left arm and leg Acute

## 2018-12-23 RX ADMIN — ATORVASTATIN CALCIUM SCH MG: 40 TABLET, FILM COATED ORAL at 08:22

## 2018-12-23 RX ADMIN — ASPIRIN SCH MG: 81 TABLET, DELAYED RELEASE ORAL at 08:22

## 2018-12-23 RX ADMIN — MIRTAZAPINE SCH MG: 15 TABLET, ORALLY DISINTEGRATING ORAL at 20:46

## 2018-12-23 RX ADMIN — BIMATOPROST SCH DROP: 0.1 SOLUTION/ DROPS OPHTHALMIC at 20:48

## 2018-12-23 NOTE — SOAPPROG
SOAP Progress Note


Assessment/Plan: 


Assessment:





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 

sensory abnormalities on the left side of her body and left sided visual 

impairment in a woman with preexisting right-sided visual impairment, as well 

as cataracts.  


* Initial functional independence measure is 79 as of 12/21/2018.  Therapies 

note left visual field loss versus ignoral.  Trialing walker versus cane.  She 

often collides with objects on the left using the walker.  She has ambulated 

greater than 150 feet with contact guard assist for steering.  She requires 

structure and cues for activities of daily living.  She can mostly dress 

herself but requires assistance for fine motor tasks including fastening her 

bra.


* Continue  PT and OT to optimize mobility and activities of daily living.  

Specific vision testing per Occupational therapy.





Memory loss and word-finding deficits


* Scored 23/30 on the [employer] mental status exam.  Has mild to 

moderate decrease attention memory and word retrieval.


* Continue Speech and Language Pathology.





Secondary prevention of cerebrovascular accident with aspirin, blood pressure 

control, and atorvastatin for dyslipidemia.





Hypertension.  She reports a history of white coat hypertension.  Her blood 

pressure has been most often well above target of 140/90 during her hospital 

stay.  


* Started amlodipine at 2.5 mg daily starting 12/19/2018.  Increased to 5 mg QD 

on 12/20/2018.  


* Adequate control.





Urinary incontinence at night.  May be due to visual challenges delaying 

ambulation to bathroom, however it depresses her functional independence 

measure by 6 points.  Initiate bedside commode, 12/21/2018.





Question of bleeding gums.  Only anticoagulant/anti-platelet agent is 81 mg of 

aspirin.  She did not receive any other anticoagulants during her hospital 

stay.  She denies history of dental problems.  If it continues will check CBC 

and coagulation profile.





Glaucoma.  Continue her eyedrops.





Anxiety and depression.  Continue citalopram and mirtazapine.





DVT prophylaxis.  She has good mobility and no hemiplegia.  Will not initiate 

pharmacologic anticoagulation, especially in light of history of retinal 

hemorrhage.





DISPOSITION:  Unclear whether she will be able to achieve sufficient 

independence to return to independent living facility; might need increased 

services.  Planning for home visit to assess function in a familiar environment 

and for family conference.  Tentative discharge date set for 1/2/2018.





FOLLOW-UP.  Primary care provider is Dr. Ang Simpson.  She will have follow up 

with her ophthalmologist at the [facility] in [place] is Dr. Prince.  She may also benefit from a neuroophthalmology consultation.








12/23/18 12:55





Subjective: 





No complaints.  Sleeping well.  Not in pain.  Continues to have low vision.  

Reports she is working on using trekking pole with therapies, otherwise using 

front wheeled walker with nursing.


Objective: 





 Vital Signs











Temp Pulse Resp BP Pulse Ox


 


 36.6 C   81   18   154/91 H  93 


 


 12/23/18 08:20  12/23/18 08:20  12/23/18 08:20  12/23/18 08:22  12/23/18 08:20








 











 12/22/18 12/23/18 12/24/18





 05:59 05:59 05:59


 


Intake Total 1800 840 


 


Output Total 1150 200 


 


Balance 650 640 














Physical Exam





- Physical Exam


General Appearance: WD/WN, alert, no apparent distress


Respiratory: No respiratory distress, No accessory muscle use


Skin: normal color, warm/dry


Neuro/Psych: alert, normal mood/affect, oriented x 3





ICD10 Worksheet


Patient Problems: 


 Problems











Problem Status Onset


 


CVA (cerebral vascular accident) Acute  


 


Numbness and tingling of left arm and leg Acute

## 2018-12-24 RX ADMIN — ATORVASTATIN CALCIUM SCH MG: 40 TABLET, FILM COATED ORAL at 08:12

## 2018-12-24 RX ADMIN — BIMATOPROST SCH DROP: 0.1 SOLUTION/ DROPS OPHTHALMIC at 21:34

## 2018-12-24 RX ADMIN — MIRTAZAPINE SCH MG: 15 TABLET, ORALLY DISINTEGRATING ORAL at 21:42

## 2018-12-24 RX ADMIN — DORZOLAMIDE HYDROCHLORIDE AND TIMOLOL MALEATE SCH DROP: 20; 5 SOLUTION/ DROPS OPHTHALMIC at 21:33

## 2018-12-24 RX ADMIN — ASPIRIN SCH MG: 81 TABLET, DELAYED RELEASE ORAL at 08:13

## 2018-12-24 NOTE — SOAPPROG
SOAP Progress Note


Assessment/Plan: 


Assessment:





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 

sensory abnormalities on the left side of her body and left sided visual 

impairment in a woman with preexisting right-sided visual impairment, as well 

as cataracts.  


* Initial functional independence measure is 79 as of 12/21/2018.  Therapies 

note left visual field loss versus ignoral.  Trialing walker versus cane.  She 

often collides with objects on the left using the walker.  She has ambulated 

greater than 150 feet with contact guard assist for steering.  She requires 

structure and cues for activities of daily living.  She can mostly dress 

herself but requires assistance for fine motor tasks including fastening her 

bra.


* Continue  PT and OT to optimize mobility and activities of daily living.  

Specific vision testing per Occupational therapy.





Memory loss and word-finding deficits


* Scored 23/30 on the [employer] mental status exam.  Has mild to 

moderate decrease attention memory and word retrieval.


* Continue Speech and Language Pathology.





Secondary prevention of cerebrovascular accident with aspirin, blood pressure 

control, and atorvastatin for dyslipidemia.





Hypertension.  She reports a history of white coat hypertension.  Her blood 

pressure has been most often well above target of 140/90 during her hospital 

stay.  


* Started amlodipine at 2.5 mg daily starting 12/19/2018.  Increased to 5 mg QD 

on 12/20/2018.  Elevated times several days as of 12/24/2018.  Increase 

amlodipine to 10 mg beginning 12/24/2018, with extra dose of 5 mg.


* Appropriate to use amlodipine rather than 2nd agent e.g. diuretic or ACE 

inhibitor or ARB, as she had renal insufficiency in the hospital with GFR in 

the 40s.





Urinary incontinence at night.  May be due to visual challenges delaying 

ambulation to bathroom, however it depresses her functional independence 

measure by 6 points.  Initiate bedside commode, 12/21/2018.





Question of bleeding gums.  Only anticoagulant/anti-platelet agent is 81 mg of 

aspirin.  She did not receive any other anticoagulants during her hospital 

stay.  She denies history of dental problems.  If it continues will check CBC 

and coagulation profile.





Glaucoma.  Continue her eyedrops.





Anxiety and depression.  Continue citalopram and mirtazapine.





DVT prophylaxis.  She has good mobility and no hemiplegia.  Will not initiate 

pharmacologic anticoagulation, especially in light of history of retinal 

hemorrhage.





DISPOSITION:  Unclear whether she will be able to achieve sufficient 

independence to return to independent living facility, due to low vision; might 

need increased services.  Planning for home visit to assess function in a 

familiar environment and for family conference.  Tentative discharge date set 

for 1/2/2018.





FOLLOW-UP.  Primary care provider is Dr. Ang Simpson.  She will have follow up 

with her ophthalmologist at the [facility] in [place] is Dr. Prince.  She may also benefit from a neuroophthalmology consultation.





12/24/18 09:33





Subjective: 





No complaints.  Sleeping well.  Not in pain.  Denies anxiety.


Objective: 





 Vital Signs











Temp Pulse Resp BP Pulse Ox


 


 37.2 C   76   16   154/94 H  94 


 


 12/24/18 05:55  12/24/18 05:55  12/24/18 05:55  12/24/18 08:13  12/24/18 05:55








 











 12/23/18 12/24/18 12/25/18





 05:59 05:59 05:59


 


Intake Total 840 940 


 


Output Total 200  


 


Balance 640 940 














Physical Exam





- Physical Exam


General Appearance: WD/WN, alert, no apparent distress


Respiratory: normal breath sounds, No crackles, No rhonchi, No wheezing


Cardiac/Chest: regular rate, rhythm, JVD (To just below angle of jaw), No edema

, No diastolic murmur, No systolic murmur


Skin: normal color, warm/dry


Neuro/Psych: alert, normal mood/affect, oriented x 3





ICD10 Worksheet


Patient Problems: 


 Problems











Problem Status Onset


 


CVA (cerebral vascular accident) Acute  


 


Numbness and tingling of left arm and leg Acute

## 2018-12-25 RX ADMIN — MIRTAZAPINE SCH MG: 15 TABLET, ORALLY DISINTEGRATING ORAL at 20:49

## 2018-12-25 RX ADMIN — ASPIRIN SCH MG: 81 TABLET, DELAYED RELEASE ORAL at 08:45

## 2018-12-25 RX ADMIN — BIMATOPROST SCH DROP: 0.1 SOLUTION/ DROPS OPHTHALMIC at 20:50

## 2018-12-25 RX ADMIN — ATORVASTATIN CALCIUM SCH MG: 40 TABLET, FILM COATED ORAL at 08:45

## 2018-12-25 RX ADMIN — DORZOLAMIDE HYDROCHLORIDE AND TIMOLOL MALEATE SCH DROP: 20; 5 SOLUTION/ DROPS OPHTHALMIC at 20:55

## 2018-12-25 RX ADMIN — DORZOLAMIDE HYDROCHLORIDE AND TIMOLOL MALEATE SCH DROP: 20; 5 SOLUTION/ DROPS OPHTHALMIC at 09:06

## 2018-12-25 NOTE — SOAPPROG
SOAP Progress Note


Assessment/Plan: 


Assessment:





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 

sensory abnormalities on the left side of her body and left sided visual 

impairment in a woman with preexisting right-sided visual impairment, as well 

as cataracts.  


* Initial functional independence measure is 79 as of 12/21/2018.  Therapies 

note left visual field loss versus ignoral.  Trialing walker versus cane.  She 

often collides with objects on the left using the walker.  She has ambulated 

greater than 150 feet with contact guard assist for steering.  She requires 

structure and cues for activities of daily living.  She can mostly dress 

herself but requires assistance for fine motor tasks including fastening her 

bra.


* Continue  PT and OT to optimize mobility and activities of daily living.  

Specific vision testing per Occupational therapy.





Memory loss and word-finding deficits


* Scored 23/30 on the [employer] mental status exam.  Has mild to 

moderate decrease attention memory and word retrieval.


* Continue Speech and Language Pathology.





Secondary prevention of cerebrovascular accident with aspirin, blood pressure 

control, and atorvastatin for dyslipidemia.





Hypertension.  She reports a history of white coat hypertension.  Her blood 

pressure has been most often well above target of 140/90 during her hospital 

stay.  


* Started amlodipine at 2.5 mg daily starting 12/19/2018.  Increased to 5 mg QD 

on 12/20/2018.  Elevated times several days as of 12/24/2018.  Increase 

amlodipine to 10 mg beginning 12/24/2018, with extra dose of 5 mg.


* Appropriate to use amlodipine rather than 2nd agent e.g. diuretic or ACE 

inhibitor or ARB, as she had renal insufficiency in the hospital with GFR in 

the 40s.


* Well controlled as of 12/25/2018.





Urinary incontinence at night.  May be due to visual challenges delaying 

ambulation to bathroom, however it depresses her functional independence 

measure by 6 points.  Initiate bedside commode, 12/21/2018.





Question of bleeding gums.  Only anticoagulant/anti-platelet agent is 81 mg of 

aspirin.  She did not receive any other anticoagulants during her hospital 

stay.  She denies history of dental problems.  If it continues will check CBC 

and coagulation profile.





Glaucoma.  Continue her eyedrops.





Anxiety and depression.  Continue citalopram and mirtazapine.





DVT prophylaxis.  She has good mobility and no hemiplegia.  Will not initiate 

pharmacologic anticoagulation, especially in light of history of retinal 

hemorrhage.





DISPOSITION:  Unclear whether she will be able to achieve sufficient 

independence to return to independent living facility, due to low vision; might 

need increased services.  Planning for home visit to assess function in a 

familiar environment and for family conference.  Tentative discharge date set 

for 1/2/2018.





FOLLOW-UP.  Primary care provider is Dr. Ang Simpson.  She will have follow up 

with her ophthalmologist at the [facility] in [place] is Dr. Prince.  She may also benefit from a neuroophthalmology consultation.








12/25/18 13:25





Subjective: 





No complaints.  Not in pain.  Sleeping well.  Wishes she could see better.


Objective: 





 Vital Signs











Temp Pulse Resp BP Pulse Ox


 


 36.9 C   79   16   136/81 H  95 


 


 12/25/18 05:10  12/25/18 05:10  12/25/18 05:10  12/25/18 08:46  12/25/18 05:10








 











 12/24/18 12/25/18 12/26/18





 05:59 05:59 05:59


 


Intake Total 940 1100 


 


Output Total  700 300


 


Balance 940 400 -300














Physical Exam





- Physical Exam


General Appearance: WD/WN, alert, no apparent distress


Respiratory: No respiratory distress, No accessory muscle use


Skin: normal color, warm/dry


Neuro/Psych: alert, normal mood/affect, oriented x 3, sensory deficit (Visual 

impairment), other (Ambulates with front wheeled walker, step through pattern, 

normal base of support, therapist providing standby assist.)





ICD10 Worksheet


Patient Problems: 


 Problems











Problem Status Onset


 


CVA (cerebral vascular accident) Acute  


 


Numbness and tingling of left arm and leg Acute

## 2018-12-26 RX ADMIN — ASPIRIN SCH MG: 81 TABLET, DELAYED RELEASE ORAL at 08:37

## 2018-12-26 RX ADMIN — DORZOLAMIDE HYDROCHLORIDE AND TIMOLOL MALEATE SCH DROP: 20; 5 SOLUTION/ DROPS OPHTHALMIC at 09:08

## 2018-12-26 RX ADMIN — DORZOLAMIDE HYDROCHLORIDE AND TIMOLOL MALEATE SCH DROP: 20; 5 SOLUTION/ DROPS OPHTHALMIC at 20:10

## 2018-12-26 RX ADMIN — BIMATOPROST SCH DROP: 0.1 SOLUTION/ DROPS OPHTHALMIC at 20:09

## 2018-12-26 RX ADMIN — MIRTAZAPINE SCH MG: 15 TABLET, ORALLY DISINTEGRATING ORAL at 20:10

## 2018-12-26 RX ADMIN — ATORVASTATIN CALCIUM SCH MG: 40 TABLET, FILM COATED ORAL at 08:37

## 2018-12-26 NOTE — SOAPPROG
SOAP Progress Note


Assessment/Plan: 


Assessment:





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 

sensory abnormalities on the left side of her body and left sided visual 

impairment in a woman with preexisting right-sided visual impairment, as well 

as cataracts.  


* Initial functional independence measure is 79 as of 12/21/2018.  Therapies 

note left visual field loss versus ignoral.  Trialing walker versus cane.  She 

often collides with objects on the left using the walker.  She has ambulated 

greater than 150 feet with contact guard assist for steering.  She requires 

structure and cues for activities of daily living.  She can mostly dress 

herself but requires assistance for fine motor tasks including fastening her 

bra.


* Continue  PT and OT to optimize mobility and activities of daily living.  

Specific vision testing per Occupational therapy.


* Literature review shows approximately 50% of patients have improvement in 

vision the relationship of MRI findings to improvement is more fine grain than 

this provider can ascertain.





Memory loss and word-finding deficits


* Scored 23/30 on the [employer] mental status exam.  Has mild to 

moderate decrease attention memory and word retrieval.


* Continue Speech and Language Pathology.





Secondary prevention of cerebrovascular accident with aspirin, blood pressure 

control, and atorvastatin for dyslipidemia.





Hypertension.  She reports a history of white coat hypertension.  Her blood 

pressure has been most often well above target of 140/90 during her hospital 

stay.  


* Started amlodipine at 2.5 mg daily starting 12/19/2018.  Increased to 5 mg QD 

on 12/20/2018.  Elevated times several days as of 12/24/2018.  Increase 

amlodipine to 10 mg beginning 12/24/2018, with extra dose of 5 mg.


* Appropriate to use amlodipine rather than 2nd agent e.g. diuretic or ACE 

inhibitor or ARB, as she had renal insufficiency in the hospital with GFR in 

the 40s.


* Well controlled as of 12/25/2018.





Urinary incontinence at night.  May be due to visual challenges delaying 

ambulation to bathroom, however it depresses her functional independence 

measure by 6 points.  Initiate bedside commode, 12/21/2018.





Question of bleeding gums.  Only anticoagulant/anti-platelet agent is 81 mg of 

aspirin.  She did not receive any other anticoagulants during her hospital 

stay.  She denies history of dental problems.  If it continues will check CBC 

and coagulation profile.





Glaucoma.  Continue her eyedrops.





Anxiety and depression.  Continue citalopram and mirtazapine.





DVT prophylaxis.  She has good mobility and no hemiplegia.  Will not initiate 

pharmacologic anticoagulation, especially in light of history of retinal 

hemorrhage.





DISPOSITION:  Unclear whether she will be able to achieve sufficient 

independence to return to independent living facility, due to low vision; might 

need increased services.  Planning for home visit 12/27/2018.  Tentative 

discharge date set for 1/2/2018.





FOLLOW-UP.  Primary care provider is Dr. Ang Simpson.  She will have follow up 

with her ophthalmologist at the [facility] in [place] is Dr. Prince.  She will benefit from a neuroophthalmology consultation.











12/26/18 11:37





Subjective: 





No complaints.  Reports that she has been trying to write.  Not sure if she has 

had improvement in vision which remains the major challenge for functional 

improvement.


Objective: 





 Vital Signs











Temp Pulse Resp BP Pulse Ox


 


 37.1 C   76   15   137/84 H  94 


 


 12/26/18 06:23  12/26/18 06:23  12/26/18 06:23  12/26/18 08:37  12/26/18 06:23








 











 12/25/18 12/26/18 12/27/18





 05:59 05:59 05:59


 


Intake Total 1100 1490 


 


Output Total 700 550 


 


Balance 400 940 














Physical Exam





- Physical Exam


General Appearance: WD/WN, alert, no apparent distress


Respiratory: No respiratory distress, No accessory muscle use


Skin: normal color, warm/dry


Neuro/Psych: alert, normal mood/affect, oriented x 3, sensory deficit (Low 

vision)





ICD10 Worksheet


Patient Problems: 


 Problems











Problem Status Onset


 


CVA (cerebral vascular accident) Acute  


 


Numbness and tingling of left arm and leg Acute

## 2018-12-27 RX ADMIN — ASPIRIN SCH MG: 81 TABLET, DELAYED RELEASE ORAL at 07:48

## 2018-12-27 RX ADMIN — MIRTAZAPINE SCH MG: 15 TABLET, ORALLY DISINTEGRATING ORAL at 20:09

## 2018-12-27 RX ADMIN — DORZOLAMIDE HYDROCHLORIDE AND TIMOLOL MALEATE SCH DROP: 20; 5 SOLUTION/ DROPS OPHTHALMIC at 07:49

## 2018-12-27 RX ADMIN — DORZOLAMIDE HYDROCHLORIDE AND TIMOLOL MALEATE SCH DROP: 20; 5 SOLUTION/ DROPS OPHTHALMIC at 20:09

## 2018-12-27 RX ADMIN — BIMATOPROST SCH DROP: 0.1 SOLUTION/ DROPS OPHTHALMIC at 20:09

## 2018-12-27 RX ADMIN — ATORVASTATIN CALCIUM SCH MG: 40 TABLET, FILM COATED ORAL at 07:48

## 2018-12-27 NOTE — SOAPPROG
SOAP Progress Note


Assessment/Plan: 


Assessment:





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 

sensory abnormalities on the left side of her body and left sided visual 

impairment in a woman with preexisting right-sided visual impairment, as well 

as cataracts.  


* Initial functional independence measure is 79 as of 12/21/2018.  Therapies 

note left visual field loss versus ignoral.  Trialing walker versus cane.  She 

often collides with objects on the left using the walker.  She has ambulated 

greater than 150 feet with contact guard assist for steering.  She requires 

structure and cues for activities of daily living.  She can mostly dress 

herself but requires assistance for fine motor tasks including fastening her 

bra.


* Continue  PT and OT to optimize mobility and activities of daily living.  

Specific vision testing per Occupational therapy.


* Literature review shows approximately 50% of patients have improvement in 

vision the relationship of MRI findings to improvement is more fine grain than 

this provider can ascertain.





Memory loss and word-finding deficits


* Scored 23/30 on the [employer] mental status exam.  Has mild to 

moderate decrease attention memory and word retrieval.


* Continue Speech and Language Pathology.





Secondary prevention of cerebrovascular accident with aspirin, blood pressure 

control, and atorvastatin for dyslipidemia.





Hypertension.  She reports a history of white coat hypertension.  Her blood 

pressure has been most often well above target of 140/90 during her hospital 

stay.  


* Started amlodipine at 2.5 mg daily starting 12/19/2018.  Increased to 5 mg QD 

on 12/20/2018.  Elevated times several days as of 12/24/2018.  Increased 

amlodipine to 10 mg beginning 12/24/2018, with extra dose of 5 mg.


* Appropriate to use amlodipine rather than 2nd agent e.g. diuretic or ACE 

inhibitor or ARB, as she had renal insufficiency in the hospital with GFR in 

the 40s.





Urinary incontinence at night.  May be due to visual challenges delaying 

ambulation to bathroom, however it depresses her functional independence 

measure by 6 points.  Initiate bedside commode, 12/21/2018.





Question of bleeding gums.  Only anticoagulant/anti-platelet agent is 81 mg of 

aspirin.  She did not receive any other anticoagulants during her hospital 

stay.  She denies history of dental problems.  If it continues will check CBC 

and coagulation profile.





Glaucoma.  Continue her eyedrops.





Anxiety and depression.  Continue citalopram and mirtazapine.





DVT prophylaxis.  She has good mobility and no hemiplegia.  Will not initiate 

pharmacologic anticoagulation, especially in light of history of retinal 

hemorrhage.





DISPOSITION:  Unclear whether she will be able to achieve sufficient 

independence to return to independent living facility, due to low vision; might 

need increased services.  Tentative discharge date set for 1/2/2018.





FOLLOW-UP.  Primary care provider is Dr. Ang Simpson.  She will have follow up 

with her ophthalmologist at the [facility] in [place] is Dr. Prince.  She will benefit from a neuroophthalmology consultation.








12/27/18 14:19





Subjective: 





Feels that the home visit went well this morning.  She reports that the 

physical therapist told her that she seemed much more familiar with her home 

environment.  Otherwise without complaints.  Slept well, no fevers or chills, 

no cough or dyspnea.


Objective: 





 Vital Signs











Temp Pulse Resp BP Pulse Ox


 


 37.0 C   76   15   145/86 H  93 


 


 12/27/18 06:23  12/27/18 06:23  12/27/18 06:23  12/27/18 06:23  12/27/18 06:23








 











 12/26/18 12/27/18 12/28/18





 05:59 05:59 05:59


 


Intake Total 1490 1570 560


 


Output Total 550 200 


 


Balance 940 1370 560














Physical Exam





- Physical Exam


General Appearance: WD/WN, alert, no apparent distress


Respiratory: No respiratory distress, No accessory muscle use


Skin: normal color, warm/dry


Neuro/Psych: alert, normal mood/affect, oriented x 3, sensory deficit (Low 

vision), No motor weakness





ICD10 Worksheet


Patient Problems: 


 Problems











Problem Status Onset


 


CVA (cerebral vascular accident) Acute  


 


Numbness and tingling of left arm and leg Acute

## 2018-12-28 RX ADMIN — DORZOLAMIDE HYDROCHLORIDE AND TIMOLOL MALEATE SCH DROP: 20; 5 SOLUTION/ DROPS OPHTHALMIC at 20:35

## 2018-12-28 RX ADMIN — HYDROCHLOROTHIAZIDE SCH MG: 25 TABLET ORAL at 12:56

## 2018-12-28 RX ADMIN — BIMATOPROST SCH DROP: 0.1 SOLUTION/ DROPS OPHTHALMIC at 20:35

## 2018-12-28 RX ADMIN — DORZOLAMIDE HYDROCHLORIDE AND TIMOLOL MALEATE SCH DROP: 20; 5 SOLUTION/ DROPS OPHTHALMIC at 08:50

## 2018-12-28 RX ADMIN — ASPIRIN SCH MG: 81 TABLET, DELAYED RELEASE ORAL at 08:06

## 2018-12-28 RX ADMIN — MIRTAZAPINE SCH MG: 15 TABLET, ORALLY DISINTEGRATING ORAL at 20:30

## 2018-12-28 RX ADMIN — ATORVASTATIN CALCIUM SCH MG: 40 TABLET, FILM COATED ORAL at 08:06

## 2018-12-28 NOTE — SOAPPROG
SOAP Progress Note


Assessment/Plan: 


Assessment:





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 

sensory abnormalities on the left side of her body and left sided visual 

impairment in a woman with preexisting right-sided visual impairment, as well 

as cataracts.  


* Initial functional independence measure is 79 as of 12/21/2018, improved to 

94 as of 12/28/2018.  Supervision level for mobility but needs specific 

navigational cues including asking her if she sees for instance transition from 

would floor to carpeted floor.  Did well in home visit but needs more help in 

unfamiliar environment.  Standby assist and cues for ADLs including transfers, 

toileting and bathing.  Main concern for therapies regarding return home is 

safety when she gets up at night..  Therapies note left visual field loss 

versus ignoral.  Trialing walker versus cane.  She often collides with objects 

on the left using the walker.  She has ambulated greater than 150 feet with 

contact guard assist for steering.  


* Literature review shows approximately 50% of patients have improvement in 

vision the relationship of MRI findings to improvement is more fine grain than 

this provider can ascertain.





Memory loss and word-finding deficits


* Scored 23/30 on the [employer] mental status exam.  Has mild to 

moderate decrease attention, memory and word retrieval.


* Therapists note lack of follow-through on strategies regarding low vision.  

She may be hoping for visual recovery rather than participating in adaptations.


* She will need assistance for medication management.


* Continue Speech and Language Pathology.





Secondary prevention of cerebrovascular accident with aspirin, blood pressure 

control, and atorvastatin for dyslipidemia.





Hypertension.  She reports a history of white coat hypertension.  Her blood 

pressure has been most often well above target of 140/90 during her hospital 

stay.  


* Started amlodipine at 2.5 mg daily starting 12/19/2018.  Increased to 5 mg QD 

on 12/20/2018.  Elevated times several days as of 12/24/2018.  Increased 

amlodipine to 10 mg beginning 12/24/2018, with extra dose of 5 mg.


* Add low-dose hydrochlorothiazide, 12.5 mg q.day, beginning 12/28/2018.  Check 

BMP 1/1/2019.  Had renal insufficiency in the hospital with GFR in the 40s.





Urinary incontinence at night.  May be due to visual challenges delaying 

ambulation to bathroom, however it depresses her functional independence 

measure by 6 points.  Initiate bedside commode, 12/21/2018.


* No longer present as of 12/28/2018.


* Has nocturia times 1-2.





Question of bleeding gums.  Only anticoagulant/anti-platelet agent is 81 mg of 

aspirin.  She did not receive any other anticoagulants during her hospital 

stay.  She denies history of dental problems.  If it continues will check CBC 

and coagulation profile.





Glaucoma.  Continue her eyedrops.





Anxiety and depression.  Continue citalopram and mirtazapine.





DVT prophylaxis.  She has good mobility and no hemiplegia.  Will not initiate 

pharmacologic anticoagulation, especially in light of history of retinal 

hemorrhage.





DISPOSITION:  Attended staffing, 15 min.  Discussed with case management, 

dietitian, nursing, PT, OT, SLP.  Attended family meeting, 30 min, patient and 

daughter participating.  She did well in her home visit on 12/27/2018, but will 

need 24 hr assistance at least for the for several days after discharge home.  

Needs to activate long-term care policy.  Will benefit from specific assessment 

and planning with low vision specialist. Discharge date set for 1/2/2018.





FOLLOW-UP.  Primary care provider is Dr. Ang Simpson.  She will have follow up 

with her ophthalmologist at the [facility] in [place] is Dr. Prince.  She will benefit from a neuroophthalmology consultation.








12/28/18 11:45





Subjective: 





No complaints.  Sleeping well.  Not in pain.


Objective: 





 Vital Signs











Temp Pulse Resp BP Pulse Ox


 


 36.5 C   95   14   142/88 H  95 


 


 12/28/18 08:10  12/28/18 08:10  12/28/18 08:10  12/28/18 08:10  12/28/18 08:10








 











 12/27/18 12/28/18 12/29/18





 05:59 05:59 05:59


 


Intake Total 1570 660 


 


Output Total 200  


 


Balance 1370 660 














- Time Spent With Patient


Time Spent With Patient: 





Greater than 35 min floor time today, including more than 50% of time in 

coordination of care during staffing meeting, and counseling patient and 

daughter during family meeting.





Physical Exam





- Physical Exam


General Appearance: WD/WN, alert, no apparent distress


Respiratory: No respiratory distress, No accessory muscle use


Cardiac/Chest: edema (Trace to 1+ right pretibial, no edema left pretibial.)


Skin: normal color, warm/dry


Neuro/Psych: alert, normal mood/affect, oriented x 3, sensory deficit (Low 

vision)





ICD10 Worksheet


Patient Problems: 


 Problems











Problem Status Onset


 


CVA (cerebral vascular accident) Acute  


 


Numbness and tingling of left arm and leg Acute

## 2018-12-29 RX ADMIN — ASPIRIN SCH MG: 81 TABLET, DELAYED RELEASE ORAL at 08:05

## 2018-12-29 RX ADMIN — BIMATOPROST SCH DROP: 0.1 SOLUTION/ DROPS OPHTHALMIC at 20:06

## 2018-12-29 RX ADMIN — ATORVASTATIN CALCIUM SCH MG: 40 TABLET, FILM COATED ORAL at 08:05

## 2018-12-29 RX ADMIN — MIRTAZAPINE SCH MG: 15 TABLET, ORALLY DISINTEGRATING ORAL at 20:02

## 2018-12-29 RX ADMIN — DORZOLAMIDE HYDROCHLORIDE AND TIMOLOL MALEATE SCH DROP: 20; 5 SOLUTION/ DROPS OPHTHALMIC at 20:06

## 2018-12-29 RX ADMIN — DORZOLAMIDE HYDROCHLORIDE AND TIMOLOL MALEATE SCH DROP: 20; 5 SOLUTION/ DROPS OPHTHALMIC at 07:48

## 2018-12-29 RX ADMIN — HYDROCHLOROTHIAZIDE SCH MG: 25 TABLET ORAL at 08:06

## 2018-12-29 NOTE — SOAPPROG
SOAP Progress Note


Assessment/Plan: 


Assessment/Plan:





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 

sensory abnormalities on the left side of her body and left sided visual 

impairment in a woman with preexisting right-sided visual impairment, as well 

as cataracts.  


* Initial functional independence measure is 79 as of 12/21/2018, improved to 

94 as of 12/28/2018.  Supervision level for mobility but needs specific 

navigational cues including asking her if she sees for instance transition from 

would floor to carpeted floor.  Did well in home visit but needs more help in 

unfamiliar environment.  Standby assist and cues for ADLs including transfers, 

toileting and bathing.  Main concern for therapies regarding return home is 

safety when she gets up at night..  Therapies note left visual field loss 

versus ignoral.  Trialing walker versus cane.  She often collides with objects 

on the left using the walker.  She has ambulated greater than 150 feet with 

contact guard assist for steering.  


* Literature review shows approximately 50% of patients have improvement in 

vision the relationship of MRI findings to improvement is more fine grain than 

this provider can ascertain.





Memory loss and word-finding deficits


* Scored 23/30 on the [employer] mental status exam.  Has mild to 

moderate decrease attention, memory and word retrieval.


* Therapists note lack of follow-through on strategies regarding low vision.  

She may be hoping for visual recovery rather than participating in adaptations.


* She will need assistance for medication management.


* Continue Speech and Language Pathology.





Secondary prevention of cerebrovascular accident with aspirin, blood pressure 

control, and atorvastatin for dyslipidemia.





Hypertension.  She reports a history of white coat hypertension.  Her blood 

pressure has been most often well above target of 140/90 during her hospital 

stay.  


* Started amlodipine at 2.5 mg daily starting 12/19/2018.  Increased to 5 mg QD 

on 12/20/2018.  Elevated times several days as of 12/24/2018.  Increased 

amlodipine to 10 mg beginning 12/24/2018, with extra dose of 5 mg.


* Add low-dose hydrochlorothiazide, 12.5 mg q.day, beginning 12/28/2018.  Check 

BMP 1/1/2019.  Had renal insufficiency in the hospital with GFR in the 40s.





Urinary incontinence at night.  May be due to visual challenges delaying 

ambulation to bathroom, however it depresses her functional independence 

measure by 6 points.  Initiate bedside commode, 12/21/2018.


* No longer present as of 12/28/2018.


* Has nocturia times 1-2.





Question of bleeding gums.  Only anticoagulant/anti-platelet agent is 81 mg of 

aspirin.  She did not receive any other anticoagulants during her hospital 

stay.  She denies history of dental problems.  If it continues will check CBC 

and coagulation profile.





Glaucoma.  Continue her eyedrops.





Anxiety and depression.  Continue citalopram and mirtazapine.





DVT prophylaxis.  She has good mobility and no hemiplegia.  Will not initiate 

pharmacologic anticoagulation, especially in light of history of retinal 

hemorrhage.





DISPOSITION:  Attended staffing, 15 min.  Discussed with case management, 

dietitian, nursing, PT, OT, SLP.  Attended family meeting, 30 min, patient and 

daughter participating.  She did well in her home visit on 12/27/2018, but will 

need 24 hr assistance at least for the for several days after discharge home.  

Needs to activate long-term care policy.  Will benefit from specific assessment 

and planning with low vision specialist. Discharge date set for 1/2/2018.





FOLLOW-UP.  Primary care provider is Dr. Ang Simpson.  She will have follow up 

with her ophthalmologist at the [facility] in [place] is Dr. Prince.  She will benefit from a neuroophthalmology consultation.





Pt is overall doing well - We discussed her BP - will allow a couple of days 

for the HCTZ to take affect. will be cautions as don't want to decrease too low 

and affect kidney function.  Improved overall bowel/bladder function





12/29/18 11:11





Subjective: 





reporting no new changes in bowel/bladder or vision.  Feeling well without any 

pain. Good appetite.  no fevers/chills. 


Objective: 





 Vital Signs











Temp Pulse Resp BP Pulse Ox


 


 98.5 F   84   15   146/90 H  93 


 


 12/29/18 06:46  12/29/18 06:46  12/29/18 06:46  12/29/18 06:46  12/29/18 06:46








 











 12/28/18 12/29/18 12/30/18





 05:59 05:59 05:59


 


Intake Total 660 920 400


 


Balance 660 920 400














Physical Exam





- Physical Exam


General Appearance: alert, no apparent distress, other (Wears glasses)


EENT: PERRL/EOMI


Respiratory: chest non-tender, lungs clear, normal breath sounds


Cardiac/Chest: regular rate, rhythm


Abdomen: normal bowel sounds, non-tender


Skin: normal color


Extremities: other (no edema)


Neuro/Psych: no motor/sensory deficits, alert, normal mood/affect





ICD10 Worksheet


Patient Problems: 


 Problems











Problem Status Onset


 


Visual impairment Acute  


 


CVA (cerebral vascular accident) Acute  


 


Numbness and tingling of left arm and leg Acute

## 2018-12-30 RX ADMIN — DORZOLAMIDE HYDROCHLORIDE AND TIMOLOL MALEATE SCH DROP: 20; 5 SOLUTION/ DROPS OPHTHALMIC at 07:45

## 2018-12-30 RX ADMIN — BIMATOPROST SCH DROP: 0.1 SOLUTION/ DROPS OPHTHALMIC at 20:53

## 2018-12-30 RX ADMIN — DORZOLAMIDE HYDROCHLORIDE AND TIMOLOL MALEATE SCH DROP: 20; 5 SOLUTION/ DROPS OPHTHALMIC at 20:41

## 2018-12-30 RX ADMIN — MIRTAZAPINE SCH MG: 15 TABLET, ORALLY DISINTEGRATING ORAL at 20:53

## 2018-12-30 RX ADMIN — ATORVASTATIN CALCIUM SCH MG: 40 TABLET, FILM COATED ORAL at 08:00

## 2018-12-30 RX ADMIN — ASPIRIN SCH MG: 81 TABLET, DELAYED RELEASE ORAL at 08:01

## 2018-12-30 RX ADMIN — HYDROCHLOROTHIAZIDE SCH MG: 25 TABLET ORAL at 08:04

## 2018-12-30 NOTE — SOAPPROG
SOAP Progress Note


Assessment/Plan: 


Assessment/Plan: Ms. Cool is an 82 y/o female with a CVA. Has been making good 

progress during her time on the Acute Rehab Service





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 

sensory abnormalities on the left side of her body and left sided visual 

impairment in a woman with preexisting right-sided visual impairment, as well 

as cataracts.  


* Initial functional independence measure is 79 as of 12/21/2018, improved to 

94 as of 12/28/2018.  Supervision level for mobility but needs specific 

navigational cues including asking her if she sees for instance transition from 

would floor to carpeted floor.  Did well in home visit but needs more help in 

unfamiliar environment.  Standby assist and cues for ADLs including transfers, 

toileting and bathing.  Main concern for therapies regarding return home is 

safety when she gets up at night..  Therapies note left visual field loss 

versus ignoral.  Trialing walker versus cane.  She often collides with objects 

on the left using the walker.  She has ambulated greater than 150 feet with 

contact guard assist for steering.  


* Literature review shows approximately 50% of patients have improvement in 

vision. The relationship of MRI findings to improvement is more fine grain.





Memory loss and word-finding deficits


* Scored 23/30 on the [employer] mental status exam.  Has mild to 

moderate decrease attention, memory and word retrieval.


* Therapists continue to work on low-vision strategies -encouraging patient 

participation in the concern that there will be no visual improvements. 


* She will need assistance for medication management.





Secondary prevention of cerebrovascular accident with aspirin, blood pressure 

control, and atorvastatin for dyslipidemia.





Hypertension.  She reports a history of white coat hypertension.  Her blood 

pressure has been most often well above target of 140/90 during her hospital 

stay.  


* Started amlodipine at 2.5 mg daily starting 12/19/2018.  Increased to 5 mg QD 

on 12/20/2018.  Elevated times several days as of 12/24/2018.  Increased 

amlodipine to 10 mg beginning 12/24/2018, with extra dose of 5 mg.


* Add low-dose hydrochlorothiazide, 12.5 mg q.day, beginning 12/28/2018.  Check 

BMP 1/1/2019.  Had renal insufficiency in the hospital with GFR in the 40s.





Urinary incontinence at night.  Resolved .  Consistent with functional likely 2/

2 vision impairment/etc.


* No longer present as of 12/28/2018.  Has beside commode/urinals if needed 


* Has nocturia times 1-2.





Question of bleeding gums.  Only anticoagulant/anti-platelet agent is 81 mg of 

aspirin.  She did not receive any other anticoagulants during her hospital 

stay.  She denies history of dental problems.  If it continues will check CBC 

and coagulation profile.





Glaucoma.  Continue her eyedrops.





Anxiety and depression.  Continue citalopram and mirtazapine.





DVT prophylaxis.  She has good mobility and no hemiplegia.  Will not initiate 

pharmacologic anticoagulation, especially in light of history of retinal 

hemorrhage.





DISPOSITION:  Consideration of 24 hr assistance at least for the for several 

days after discharge home.  Needs to activate long-term care policy.  Will 

benefit from specific assessment and planning with low vision specialist. 

Discharge date set for 1/2/2018.





FOLLOW-UP.  Primary care provider is Dr. Ang Simpson.  She will have follow up 

with her ophthalmologist at the [facility] in [place] is Dr. Prince.  She will benefit from a neuroophthalmology consultation.








Interim Evaluation


BP slightly labile - getting BMP in the morning and pending the results will 

consider increase in the HCTZ. Pt without complaints of too frequent urination 

with the current HCTZ- will monitor 





12/30/18 09:35





Subjective: 





No concerns this morning - Continues to do well with appetite and no problems 

with swallowing. No changes in visual impairment per her perspective. No fevers/

chills, no CP/SOB. Pt feeling well this morning. 


Objective: 





 Vital Signs











Temp Pulse Resp BP Pulse Ox


 


 98.5 F   92   16   148/93 H  93 


 


 12/30/18 06:47  12/30/18 06:47  12/30/18 06:47  12/30/18 06:47  12/30/18 06:47








 











 12/29/18 12/30/18 12/31/18





 05:59 05:59 05:59


 


Intake Total 920 1150 370


 


Balance 920 1150 370














Physical Exam





- Physical Exam


General Appearance: alert, no apparent distress


EENT: other (Wheres glasses - Has small bandaid over bridge of the nose. )


Respiratory: chest non-tender, lungs clear, normal breath sounds


Cardiac/Chest: regular rate, rhythm


Abdomen: normal bowel sounds, non-tender, soft


Skin: normal color


Neuro/Psych: alert, normal mood/affect





ICD10 Worksheet


Patient Problems: 


 Problems











Problem Status Onset


 


Visual impairment Acute  


 


CVA (cerebral vascular accident) Acute  


 


Numbness and tingling of left arm and leg Acute

## 2018-12-31 RX ADMIN — DORZOLAMIDE HYDROCHLORIDE AND TIMOLOL MALEATE SCH DROP: 20; 5 SOLUTION/ DROPS OPHTHALMIC at 20:39

## 2018-12-31 RX ADMIN — ATORVASTATIN CALCIUM SCH MG: 40 TABLET, FILM COATED ORAL at 08:51

## 2018-12-31 RX ADMIN — MIRTAZAPINE SCH MG: 15 TABLET, ORALLY DISINTEGRATING ORAL at 20:38

## 2018-12-31 RX ADMIN — BIMATOPROST SCH DROP: 0.1 SOLUTION/ DROPS OPHTHALMIC at 20:39

## 2018-12-31 RX ADMIN — ASPIRIN SCH MG: 81 TABLET, DELAYED RELEASE ORAL at 08:51

## 2018-12-31 RX ADMIN — DORZOLAMIDE HYDROCHLORIDE AND TIMOLOL MALEATE SCH DROP: 20; 5 SOLUTION/ DROPS OPHTHALMIC at 08:52

## 2018-12-31 RX ADMIN — HYDROCHLOROTHIAZIDE SCH MG: 25 TABLET ORAL at 08:51

## 2018-12-31 NOTE — SOAPPROG
SOAP Progress Note


Assessment/Plan: 


Assessment/Plan: Ms. Cool is an 84 y/o female with a CVA. Has been making good 

progress during her time on the Acute Rehab Service





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 

sensory abnormalities on the left side of her body and left sided visual 

impairment in a woman with preexisting right-sided visual impairment, as well 

as cataracts.  


* Initial functional independence measure is 79 as of 12/21/2018, improved to 

94 as of 12/28/2018.  Supervision level for mobility but needs specific 

navigational cues including asking her if she sees for instance transition from 

would floor to carpeted floor.  Did well in home visit but needs more help in 

unfamiliar environment.  Standby assist and cues for ADLs including transfers, 

toileting and bathing.  Main concern for therapies regarding return home is 

safety when she gets up at night..  Therapies note left visual field loss 

versus ignoral.  Trialing walker versus cane.  She often collides with objects 

on the left using the walker.  She has ambulated greater than 150 feet with 

contact guard assist for steering.  


* Literature review shows approximately 50% of patients have improvement in 

vision. The relationship of MRI findings to improvement is more fine grain.





Memory loss and word-finding deficits


* Scored 23/30 on the [employer] mental status exam.  Has mild to 

moderate decrease attention, memory and word retrieval.


* Therapists continue to work on low-vision strategies -encouraging patient 

participation in the concern that there will be no visual improvements. 


* She will need assistance for medication management.





Secondary prevention of cerebrovascular accident with aspirin, blood pressure 

control, and atorvastatin for dyslipidemia.





Hypertension.  Somewhat labile between 110's to 140's systolic. Not much above 

target goal of 140/90.  Await BMP before further decision.   


* Started amlodipine at 2.5 mg daily starting 12/19.  Increased to 5 mg 12/20.  

Elevated times several days as of 12/24/2018.  Increased amlodipine to 10 mg 12/ 24


* Add low-dose hydrochlorothiazide, 12.5 mg q.day, beginning 12/28/2018.  Check 

BMP 12/31/18.  Had renal insufficiency in the hospital with GFR in the 40s.





Urinary incontinence at night.  Resolved .  Consistent with functional 

incontinence likely 2/2 vision impairment/etc.


* No longer present as of 12/28/2018.  Has beside commode/urinals if needed 


* Has nocturia times 1-2.





Question of bleeding gums.  Only anticoagulant/anti-platelet agent is 81 mg of 

aspirin.  She did not receive any other anticoagulants during her hospital 

stay.  She denies history of dental problems.  If it continues will check CBC 

and coagulation profile.





Glaucoma.  Continue her eyedrops.





Anxiety and depression.  Continue citalopram and mirtazapine.





DVT prophylaxis.  She has good mobility and no hemiplegia.  Will not initiate 

pharmacologic anticoagulation, especially in light of history of retinal 

hemorrhage.





DISPOSITION:  Consideration of 24 hr assistance at least for the for several 

days after discharge home.  Needs to activate long-term care policy.  Will 

benefit from specific assessment and planning with low vision specialist. 

Discharge date set for 1/2/2018.





FOLLOW-UP.  Primary care provider is Dr. Ang Simpson.  She will have follow up 

with her ophthalmologist at the [facility] in [place] is Dr. Prince.  She will benefit from a neuroophthalmology consultation.








12/31/18 08:39





Subjective: 





Doing well this morning. No new concerns. NO fevers/chills, no sob/cp. getting 

anxious to get back home but has appreciated being here for her recovery time. 


Objective: 





 Vital Signs











Temp Pulse Resp BP Pulse Ox


 


 98.2 F   80   16   143/89 H  94 


 


 12/31/18 06:21  12/31/18 06:21  12/31/18 06:21  12/31/18 06:21  12/31/18 06:21








 











 12/30/18 12/31/18 01/01/19





 05:59 05:59 05:59


 


Intake Total 1150 1700 


 


Balance 1150 1700 














Physical Exam





- Physical Exam


General Appearance: alert, no apparent distress


EENT: normal ENT inspection


Respiratory: lungs clear, normal breath sounds


Cardiac/Chest: regular rate, rhythm


Abdomen: normal bowel sounds, non-tender


Skin: normal color


Extremities: other (No LE edema)


Neuro/Psych: alert, normal mood/affect





ICD10 Worksheet


Patient Problems: 


 Problems











Problem Status Onset


 


CVA (cerebral vascular accident) Acute  


 


Numbness and tingling of left arm and leg Acute  


 


Visual impairment Acute

## 2018-12-31 NOTE — PDOREHIP
Admission [facility]





- Admission - 3 Day Assessment Period


Admission Date/Day 1: 12/18/18


Day 2: 12/19/18


Day 3: 12/20/18





- Active Diagnoses


Comorbidities and Co-existing Conditions at Admission: 77966. None of the Above





Discharge [facility]





- Discharge  - 3 Day Assessment Period


2 Days Prior to Anticipated Discharge Date: 12/31/18


1 Day Prior to Anticipated Discharge Date: 01/01/19


Anticipated Discharge Date: 01/02/19 (Will d/c to Home)





- Discharge Skin Conditions


Unhealed Pressure Ulcer (1 or more/Stage 1 or >)-Discharge: 0. No


# Stage 1 Pressure Ulcers-Discharge: 0


# Stage 2 Pressure Ulcers-Discharge: 0


# of These Stage 2 Pressure Ulcers Present on Admission: 0


# Stage 3 Pressure Ulcers-Discharge: 0


# of These Stage 3 Pressure Ulcers Present on Admission: 0


# Stage 4 Pressure Ulcers-Discharge: 0


# of These Stage 4 Pressure Ulcers Present on Admission: 0


# Unstageable Pressure Ulcers (Non-remove Dress)-Discharge: 0


# These Unstageable Pressure Ulcers (NRD)-Present on Admit: 0


# Unstageable Pressure Ulcers (Slough/Eschar)-Discharge: 0


# These Unstageable Pressure Ulcers(Slough) Present on Admit: 0


# Unstageable Pressure Ulcers (Deep Tissue Injury)-Discharge: 0


# These Unstageable Pressure Ulcers (DTI) Present on Admit: 0

## 2019-01-01 RX ADMIN — BIMATOPROST SCH DROP: 0.1 SOLUTION/ DROPS OPHTHALMIC at 20:24

## 2019-01-01 RX ADMIN — DORZOLAMIDE HYDROCHLORIDE AND TIMOLOL MALEATE SCH DROP: 20; 5 SOLUTION/ DROPS OPHTHALMIC at 20:23

## 2019-01-01 RX ADMIN — ATORVASTATIN CALCIUM SCH MG: 40 TABLET, FILM COATED ORAL at 08:13

## 2019-01-01 RX ADMIN — ASPIRIN SCH MG: 81 TABLET, DELAYED RELEASE ORAL at 08:13

## 2019-01-01 RX ADMIN — MIRTAZAPINE SCH MG: 15 TABLET, ORALLY DISINTEGRATING ORAL at 20:23

## 2019-01-01 RX ADMIN — DORZOLAMIDE HYDROCHLORIDE AND TIMOLOL MALEATE SCH DROP: 20; 5 SOLUTION/ DROPS OPHTHALMIC at 08:40

## 2019-01-01 RX ADMIN — HYDROCHLOROTHIAZIDE SCH MG: 25 TABLET ORAL at 08:13

## 2019-01-02 VITALS — DIASTOLIC BLOOD PRESSURE: 70 MMHG | SYSTOLIC BLOOD PRESSURE: 118 MMHG

## 2019-01-02 RX ADMIN — ASPIRIN SCH MG: 81 TABLET, DELAYED RELEASE ORAL at 08:47

## 2019-01-02 RX ADMIN — DORZOLAMIDE HYDROCHLORIDE AND TIMOLOL MALEATE SCH DROP: 20; 5 SOLUTION/ DROPS OPHTHALMIC at 08:55

## 2019-01-02 RX ADMIN — HYDROCHLOROTHIAZIDE SCH MG: 25 TABLET ORAL at 08:47

## 2019-01-02 RX ADMIN — ATORVASTATIN CALCIUM SCH MG: 40 TABLET, FILM COATED ORAL at 08:47

## 2019-01-02 NOTE — GDS
ADMITTING DIAGNOSIS:  Cerebrovascular accident in the right occipital lobe with 
left homonymous hemianopsia.



DISCHARGE DIAGNOSIS:  Cerebrovascular accident in the right occipital lobe with 
left homonymous hemianopsia.



OTHER DISCHARGE DIAGNOSES:  

1.  History of right eye blindness.

2.  Hypertension.



COMPLICATIONS:  There were none.



PROCEDURES:  There were none.



CONSULTATIONS:  There were none.



HISTORY AND HOSPITAL COURSE:  This patient was admitted from [facility].  She had presented there on 12/16/2018, with left-
sided weakness, unsteadiness, and difficulty walking.  She had a very high 
blood pressure of 223/107, for which she was placed on a nicardipine drip.  
Head CT showed a small area of edema in the anterior limb of the right internal 
capsule.  A brain MRI showed an acute infarction of the right occipital lobe 
and in the right thalamus.  MR angiography and a carotid Doppler revealed a 
thrombosed right posterior cerebral artery but, otherwise, no flow-limiting 
stenosis.  Echocardiogram ruled out embolic source but showed mild concentric 
LVH, mild-to-moderate tricuspid valve regurgitation, mild mitral valve 
regurgitation, and moderate pulmonic valve regurgitation.  Laboratory studies 
in the hospital showed renal insufficiency with a creatinine ranging from 1.1 
to 1.2, and an estimated glomerular filtration rate of 43 to 48.  She was 
otherwise medically stable and ready for inpatient rehabilitation. 



She did well functionally in rehabilitation.  Her initial functional 
independence measure was 79 on 12/21/2018, which is consistent with nursing 
home level of care but borderline assisted living level of function.  
Functional independence measure improved to 94 as of 12/28/2018, which is 
consistent with assisted living level of function.  Vision loss was her most 
significant functional impediment. She had a left homonymous hemianopsia.  She 
achieved supervision level for mobility but often needed specific navigation 
cues.  There was a home visit in which she did well, but needed more help in an 
unfamiliar environment.  Activities of daily living required standby assist and 
cues.   She was able to ambulate more than 150 feet with contact guard assist. 



Regarding hypertension, she was started on amlodipine 2.5 mg daily on 12/19.  
This was progressively increased to 10 mg daily as of 12/24/2018.  Blood 
pressure remained elevated.  Low-dose hydrochlorothiazide was started at 12.5 
mg daily beginning 12/28/2018. 



Several days after beginning hydrochlorothiazide there was a repeat BMP done on 
12/31/2018, which showed a creatinine of 1.3 and an estimated glomerular 
filtration rate of 39.  She was otherwise without any adverse effects of the 
hydrochlorothiazide.



DISCHARGE PLAN:  Discharge disposition is home.  Condition is good.



DIET:  Cardiac with regular texture and thin liquids.  There were no new 
allergies noted.  She has existing allergies to certain glaucoma medications.



ACTIVITY:  Ad lyssa.  She is recommended to have 24-hour supervision and will 
have assistance in her initial days at home from a low-vision specialist.



MEDICATIONS UPON DISCHARGE:  

1.  Amlodipine 10 mg p.o. daily.

2.  Aspirin 81 mg p.o. daily.

3.  Atorvastatin 40 mg p.o. daily.

4.  Bimatoprost 0.01% one drop each eye at h.s.

5.  Dorzolamide/timolol 1 drop each eye b.i.d.

6.  Escitalopram 20 mg p.o. q.h.s.

7.  Hydrochlorothiazide 12.5 mg p.o. daily.

8.  Mirtazapine 15 mg p.o. q.h.s.



ISSUES TO BE ADDRESSED AT FOLLOWUP:  

1.  Mobility and activities of daily living:  She will continue PT and OT in 
the home as well as assistance from a low-vision specialist.  Level of 
assistance at the [facility] in [place] where she had been on the 
independent living program will be increased to the assisted living level of 
assistance.

2.  Cognition:  She had memory loss and word-finding deficits.  She will 
continue working with Speech and Language Pathology at home.

3.  Hypertension:  Was much better controlled after titration and addition of 
medications.  Blood pressure on the day of discharge was 118/70.

4.  Renal insufficiency:  With an increase in her creatinine and a decrease in 
her glomerular filtration rate, but still with stage 3 chronic kidney disease.  
She can follow up with primary care and consider consultation with a 
nephrologist.

5.  Low vision:  She was recommended to have an evaluation by a neuro-
ophthalmologist.  This will be done at the [facility] where she already has an ophthalmologist.



Copy requested to:

Dr. Prince, Ophthalmologist 

[facility]



Job #:  [id number]

MTDD

## 2022-07-19 NOTE — SOAPPROG
SOAP Progress Note


Assessment/Plan: 


Assessment/Plan: Ms. Cool is an 84 y/o female with a CVA. Has been making good 

progress during her time on the Acute Rehab Service





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 

sensory abnormalities on the left side of her body and left sided visual 

impairment in a woman with preexisting right-sided visual impairment, as well 

as cataracts.  


* Initial functional independence measure is 79 as of 12/21/2018, improved to 

94 as of 12/28/2018.  Supervision level for mobility but needs specific 

navigational cues including asking her if she sees for instance transition from 

would floor to carpeted floor.  Did well in home visit but needs more help in 

unfamiliar environment.  Standby assist and cues for ADLs including transfers, 

toileting and bathing.  Main concern for therapies regarding return home is 

safety when she gets up at night..  Therapies note left visual field loss 

versus ignoral.  Trialing walker versus cane.  She often collides with objects 

on the left using the walker.  She has ambulated greater than 150 feet with 

contact guard assist for steering.  


* Literature review shows approximately 50% of patients have improvement in 

vision. The relationship of MRI findings to improvement is more fine grain.





Memory loss and word-finding deficits


* Scored 23/30 on the [employer] mental status exam.  Has mild to 

moderate decrease attention, memory and word retrieval.


* Therapists continue to work on low-vision strategies -encouraging patient 

participation in the concern that there will be no visual improvements. 


* She will need assistance for medication management.





Secondary prevention of cerebrovascular accident with aspirin, blood pressure 

control, and atorvastatin for dyslipidemia.





Hypertension.  Somewhat labile between 110's to 140's systolic although over 

the last 24-48hrs has leveled out well.   


* Started amlodipine at 2.5 mg daily starting 12/19.  Increased to 5 mg 12/20.  

Elevated times several days as of 12/24/2018.  Increased amlodipine to 10 mg 12/ 24


* Add low-dose hydrochlorothiazide, 12.5 mg q.day, beginning 12/28/2018.  BMP 

with slight bump in Creatinine. Will need a f/u with PCP to evaluate creatinine 

in the next 1-2 weeks and make adjustments as needed. 





Urinary incontinence at night.  Resolved .  Consistent with functional 

incontinence likely 2/2 vision impairment/etc.


* No longer present as of 12/28/2018.  Has beside commode/urinals if needed 





Question of bleeding gums.  Nothing concerning over the last 4 days - 


* Only anticoagulant/anti-platelet agent is 81 mg of aspirin.  She did not 

receive any other anticoagulants during her hospital stay.  


* She denies history of dental problems.





Glaucoma.  Continue her eyedrops.





Anxiety and depression.  Continue citalopram and mirtazapine.





DVT prophylaxis.  She has good mobility and no hemiplegia.  Will not initiate 

pharmacologic anticoagulation, especially in light of history of retinal 

hemorrhage.





DISPOSITION:  Pt reporting that is going to have home health care in the night 

to support her when she first goes home.   Needs to activate long-term care 

policy.  Will benefit from specific assessment and planning with low vision 

specialist. Discharge date set for 1/2/2018.





FOLLOW-UP.  Primary care provider is Dr. Ang Simpson.  She will have follow up 

with her ophthalmologist at the [facility] in [place] is Dr. Prince.  She will benefit from a neuroophthalmology consultation given the new 

deficits








01/01/19 11:17





Subjective: 





Feeling very good today - had some questions regarding stroke recovery patterns 

and time frame that we were able to talk about. No new neurologic changes. 

encouraged a f/u with PCP in the next 1-2 weeks for another creatinine check 

since it bumped slightly. She also will f/u with her Ophthamologist at [facility].  Has home care in place to start tomorrow night. 


Objective: 





 Vital Signs











Temp Pulse Resp BP Pulse Ox


 


 98.6 F   78   16   135/83 H  95 


 


 01/01/19 05:55  01/01/19 05:55  01/01/19 05:55  01/01/19 05:55  01/01/19 05:55








 Laboratory Results





 12/31/18 06:45 





 











 12/31/18 01/01/19 01/02/19





 05:59 05:59 05:59


 


Intake Total 1700 1200 360


 


Balance 1700 1200 360














Physical Exam





- Physical Exam


General Appearance: alert, no apparent distress


EENT: other (wearing glasses. Has a small/healed abrasion over the bridge of 

her nose)


Respiratory: lungs clear, normal breath sounds


Cardiac/Chest: regular rate, rhythm


Abdomen: non-tender, soft


Skin: normal color


Neuro/Psych: alert, normal mood/affect





ICD10 Worksheet


Patient Problems: 


 Problems











Problem Status Onset


 


CVA (cerebral vascular accident) Acute  


 


Numbness and tingling of left arm and leg Acute  


 


Visual impairment Acute Mercedes Flap Text: The defect edges were debeveled with a #15 scalpel blade.  Given the location of the defect, shape of the defect and the proximity to free margins a Mercedes flap was deemed most appropriate.  Using a sterile surgical marker, an appropriate advancement flap was drawn incorporating the defect and placing the expected incisions within the relaxed skin tension lines where possible. The area thus outlined was incised deep to adipose tissue with a #15 scalpel blade.  The skin margins were undermined to an appropriate distance in all directions utilizing iris scissors.